# Patient Record
Sex: FEMALE | Race: WHITE | NOT HISPANIC OR LATINO | Employment: OTHER
[De-identification: names, ages, dates, MRNs, and addresses within clinical notes are randomized per-mention and may not be internally consistent; named-entity substitution may affect disease eponyms.]

---

## 2017-01-03 ENCOUNTER — RECORDS - HEALTHEAST (OUTPATIENT)
Dept: ADMINISTRATIVE | Facility: OTHER | Age: 82
End: 2017-01-03

## 2017-02-17 ENCOUNTER — COMMUNICATION - HEALTHEAST (OUTPATIENT)
Dept: FAMILY MEDICINE | Facility: CLINIC | Age: 82
End: 2017-02-17

## 2017-04-18 ENCOUNTER — OFFICE VISIT - HEALTHEAST (OUTPATIENT)
Dept: INTERNAL MEDICINE | Facility: CLINIC | Age: 82
End: 2017-04-18

## 2017-04-18 DIAGNOSIS — M81.0 SENILE OSTEOPOROSIS: ICD-10-CM

## 2017-04-24 ENCOUNTER — COMMUNICATION - HEALTHEAST (OUTPATIENT)
Dept: FAMILY MEDICINE | Facility: CLINIC | Age: 82
End: 2017-04-24

## 2017-04-27 ENCOUNTER — AMBULATORY - HEALTHEAST (OUTPATIENT)
Dept: NURSING | Facility: CLINIC | Age: 82
End: 2017-04-27

## 2017-04-27 DIAGNOSIS — M81.0 SENILE OSTEOPOROSIS: ICD-10-CM

## 2017-05-04 ENCOUNTER — COMMUNICATION - HEALTHEAST (OUTPATIENT)
Dept: FAMILY MEDICINE | Facility: CLINIC | Age: 82
End: 2017-05-04

## 2017-05-04 ENCOUNTER — OFFICE VISIT - HEALTHEAST (OUTPATIENT)
Dept: FAMILY MEDICINE | Facility: CLINIC | Age: 82
End: 2017-05-04

## 2017-05-04 ENCOUNTER — RECORDS - HEALTHEAST (OUTPATIENT)
Dept: ADMINISTRATIVE | Facility: OTHER | Age: 82
End: 2017-05-04

## 2017-05-04 DIAGNOSIS — E78.5 HYPERLIPIDEMIA: ICD-10-CM

## 2017-05-04 DIAGNOSIS — M81.0 SENILE OSTEOPOROSIS: ICD-10-CM

## 2017-05-04 DIAGNOSIS — E11.9 TYPE 2 DIABETES MELLITUS (H): ICD-10-CM

## 2017-05-04 DIAGNOSIS — I25.10 CORONARY ATHEROSCLEROSIS: ICD-10-CM

## 2017-05-04 DIAGNOSIS — I10 ESSENTIAL HYPERTENSION: ICD-10-CM

## 2017-05-04 LAB
CHOLEST SERPL-MCNC: 168 MG/DL
FASTING STATUS PATIENT QL REPORTED: YES
HBA1C MFR BLD: 6.1 % (ref 3.5–6)
HDLC SERPL-MCNC: 71 MG/DL
LDLC SERPL CALC-MCNC: 77 MG/DL
TRIGL SERPL-MCNC: 99 MG/DL

## 2017-05-05 ENCOUNTER — COMMUNICATION - HEALTHEAST (OUTPATIENT)
Dept: FAMILY MEDICINE | Facility: CLINIC | Age: 82
End: 2017-05-05

## 2017-10-31 ENCOUNTER — AMBULATORY - HEALTHEAST (OUTPATIENT)
Dept: NURSING | Facility: CLINIC | Age: 82
End: 2017-10-31

## 2017-10-31 DIAGNOSIS — M81.0 SENILE OSTEOPOROSIS: ICD-10-CM

## 2017-11-02 ENCOUNTER — RECORDS - HEALTHEAST (OUTPATIENT)
Dept: ADMINISTRATIVE | Facility: OTHER | Age: 82
End: 2017-11-02

## 2017-11-02 ENCOUNTER — OFFICE VISIT - HEALTHEAST (OUTPATIENT)
Dept: FAMILY MEDICINE | Facility: CLINIC | Age: 82
End: 2017-11-02

## 2017-11-02 DIAGNOSIS — E11.9 TYPE 2 DIABETES MELLITUS (H): ICD-10-CM

## 2017-11-02 DIAGNOSIS — Z23 NEED FOR IMMUNIZATION AGAINST INFLUENZA: ICD-10-CM

## 2017-11-02 DIAGNOSIS — M81.0 SENILE OSTEOPOROSIS: ICD-10-CM

## 2017-11-02 DIAGNOSIS — I25.10 CORONARY ATHEROSCLEROSIS: ICD-10-CM

## 2017-11-02 DIAGNOSIS — I10 HTN (HYPERTENSION): ICD-10-CM

## 2017-11-02 DIAGNOSIS — E78.5 HYPERLIPIDEMIA: ICD-10-CM

## 2017-11-02 LAB
CHOLEST SERPL-MCNC: 164 MG/DL
FASTING STATUS PATIENT QL REPORTED: YES
HBA1C MFR BLD: 6 % (ref 3.5–6)
HDLC SERPL-MCNC: 75 MG/DL
LDLC SERPL CALC-MCNC: 73 MG/DL
TRIGL SERPL-MCNC: 78 MG/DL

## 2017-11-02 ASSESSMENT — MIFFLIN-ST. JEOR: SCORE: 1050.68

## 2017-11-03 ENCOUNTER — COMMUNICATION - HEALTHEAST (OUTPATIENT)
Dept: FAMILY MEDICINE | Facility: CLINIC | Age: 82
End: 2017-11-03

## 2017-11-07 ENCOUNTER — RECORDS - HEALTHEAST (OUTPATIENT)
Dept: ADMINISTRATIVE | Facility: OTHER | Age: 82
End: 2017-11-07

## 2017-12-06 ENCOUNTER — COMMUNICATION - HEALTHEAST (OUTPATIENT)
Dept: FAMILY MEDICINE | Facility: CLINIC | Age: 82
End: 2017-12-06

## 2017-12-18 ENCOUNTER — COMMUNICATION - HEALTHEAST (OUTPATIENT)
Dept: FAMILY MEDICINE | Facility: CLINIC | Age: 82
End: 2017-12-18

## 2017-12-18 DIAGNOSIS — E78.5 HYPERLIPIDEMIA: ICD-10-CM

## 2017-12-18 DIAGNOSIS — I10 HTN (HYPERTENSION): ICD-10-CM

## 2018-01-29 ENCOUNTER — COMMUNICATION - HEALTHEAST (OUTPATIENT)
Dept: FAMILY MEDICINE | Facility: CLINIC | Age: 83
End: 2018-01-29

## 2018-04-26 ENCOUNTER — COMMUNICATION - HEALTHEAST (OUTPATIENT)
Dept: FAMILY MEDICINE | Facility: CLINIC | Age: 83
End: 2018-04-26

## 2018-05-10 ENCOUNTER — OFFICE VISIT - HEALTHEAST (OUTPATIENT)
Dept: INTERNAL MEDICINE | Facility: CLINIC | Age: 83
End: 2018-05-10

## 2018-05-10 DIAGNOSIS — M81.0 SENILE OSTEOPOROSIS: ICD-10-CM

## 2018-09-25 ENCOUNTER — RECORDS - HEALTHEAST (OUTPATIENT)
Dept: ADMINISTRATIVE | Facility: OTHER | Age: 83
End: 2018-09-25

## 2018-09-25 ENCOUNTER — RECORDS - HEALTHEAST (OUTPATIENT)
Dept: BONE DENSITY | Facility: CLINIC | Age: 83
End: 2018-09-25

## 2018-09-25 DIAGNOSIS — M81.0 AGE-RELATED OSTEOPOROSIS WITHOUT CURRENT PATHOLOGICAL FRACTURE: ICD-10-CM

## 2018-11-07 ENCOUNTER — COMMUNICATION - HEALTHEAST (OUTPATIENT)
Dept: INTERNAL MEDICINE | Facility: CLINIC | Age: 83
End: 2018-11-07

## 2018-11-08 ENCOUNTER — OFFICE VISIT - HEALTHEAST (OUTPATIENT)
Dept: FAMILY MEDICINE | Facility: CLINIC | Age: 83
End: 2018-11-08

## 2018-11-08 DIAGNOSIS — I10 ESSENTIAL HYPERTENSION: ICD-10-CM

## 2018-11-08 DIAGNOSIS — Z79.4 TYPE 2 DIABETES MELLITUS TREATED WITH INSULIN (H): ICD-10-CM

## 2018-11-08 DIAGNOSIS — M81.0 SENILE OSTEOPOROSIS: ICD-10-CM

## 2018-11-08 DIAGNOSIS — Z00.00 ROUTINE GENERAL MEDICAL EXAMINATION AT A HEALTH CARE FACILITY: ICD-10-CM

## 2018-11-08 DIAGNOSIS — E78.2 MIXED HYPERLIPIDEMIA: ICD-10-CM

## 2018-11-08 DIAGNOSIS — E11.9 TYPE 2 DIABETES MELLITUS TREATED WITH INSULIN (H): ICD-10-CM

## 2018-11-08 DIAGNOSIS — Z00.00 HEALTH CARE MAINTENANCE: ICD-10-CM

## 2018-11-08 LAB
ALBUMIN SERPL-MCNC: 4.1 G/DL (ref 3.5–5)
ALP SERPL-CCNC: 75 U/L (ref 45–120)
ALT SERPL W P-5'-P-CCNC: 17 U/L (ref 0–45)
ANION GAP SERPL CALCULATED.3IONS-SCNC: 16 MMOL/L (ref 5–18)
AST SERPL W P-5'-P-CCNC: 27 U/L (ref 0–40)
BILIRUB SERPL-MCNC: 0.6 MG/DL (ref 0–1)
BUN SERPL-MCNC: 26 MG/DL (ref 8–28)
CALCIUM SERPL-MCNC: 10.3 MG/DL (ref 8.5–10.5)
CHLORIDE BLD-SCNC: 107 MMOL/L (ref 98–107)
CHOLEST SERPL-MCNC: 179 MG/DL
CO2 SERPL-SCNC: 20 MMOL/L (ref 22–31)
CREAT SERPL-MCNC: 0.94 MG/DL (ref 0.6–1.1)
CREAT UR-MCNC: 78 MG/DL
FASTING STATUS PATIENT QL REPORTED: YES
GFR SERPL CREATININE-BSD FRML MDRD: 57 ML/MIN/1.73M2
GLUCOSE BLD-MCNC: 116 MG/DL (ref 70–125)
HBA1C MFR BLD: 6 % (ref 3.5–6)
HDLC SERPL-MCNC: 76 MG/DL
HGB BLD-MCNC: 13.6 G/DL (ref 12–16)
LDLC SERPL CALC-MCNC: 87 MG/DL
MICROALBUMIN UR-MCNC: 0.99 MG/DL (ref 0–1.99)
MICROALBUMIN/CREAT UR: 12.7 MG/G
POTASSIUM BLD-SCNC: 4.9 MMOL/L (ref 3.5–5)
PROT SERPL-MCNC: 7.3 G/DL (ref 6–8)
SODIUM SERPL-SCNC: 143 MMOL/L (ref 136–145)
TRIGL SERPL-MCNC: 79 MG/DL

## 2018-11-08 ASSESSMENT — MIFFLIN-ST. JEOR: SCORE: 1040.48

## 2018-11-09 LAB — 25(OH)D3 SERPL-MCNC: 44.5 NG/ML (ref 30–80)

## 2018-11-12 ENCOUNTER — AMBULATORY - HEALTHEAST (OUTPATIENT)
Dept: NURSING | Facility: CLINIC | Age: 83
End: 2018-11-12

## 2018-11-12 ENCOUNTER — COMMUNICATION - HEALTHEAST (OUTPATIENT)
Dept: FAMILY MEDICINE | Facility: CLINIC | Age: 83
End: 2018-11-12

## 2018-11-14 ENCOUNTER — RECORDS - HEALTHEAST (OUTPATIENT)
Dept: ADMINISTRATIVE | Facility: OTHER | Age: 83
End: 2018-11-14

## 2018-12-11 ENCOUNTER — COMMUNICATION - HEALTHEAST (OUTPATIENT)
Dept: FAMILY MEDICINE | Facility: CLINIC | Age: 83
End: 2018-12-11

## 2018-12-11 DIAGNOSIS — I10 ESSENTIAL HYPERTENSION: ICD-10-CM

## 2018-12-28 ENCOUNTER — COMMUNICATION - HEALTHEAST (OUTPATIENT)
Dept: FAMILY MEDICINE | Facility: CLINIC | Age: 83
End: 2018-12-28

## 2018-12-28 DIAGNOSIS — E11.9 TYPE 2 DIABETES MELLITUS (H): ICD-10-CM

## 2019-05-09 ENCOUNTER — RECORDS - HEALTHEAST (OUTPATIENT)
Dept: ADMINISTRATIVE | Facility: OTHER | Age: 84
End: 2019-05-09

## 2019-05-13 ENCOUNTER — COMMUNICATION - HEALTHEAST (OUTPATIENT)
Dept: FAMILY MEDICINE | Facility: CLINIC | Age: 84
End: 2019-05-13

## 2019-05-13 DIAGNOSIS — E11.9 TYPE 2 DIABETES MELLITUS (H): ICD-10-CM

## 2019-05-15 ENCOUNTER — COMMUNICATION - HEALTHEAST (OUTPATIENT)
Dept: FAMILY MEDICINE | Facility: CLINIC | Age: 84
End: 2019-05-15

## 2019-05-15 ENCOUNTER — OFFICE VISIT - HEALTHEAST (OUTPATIENT)
Dept: INTERNAL MEDICINE | Facility: CLINIC | Age: 84
End: 2019-05-15

## 2019-05-15 DIAGNOSIS — M81.0 SENILE OSTEOPOROSIS: ICD-10-CM

## 2019-05-15 DIAGNOSIS — Z79.4 TYPE 2 DIABETES MELLITUS WITHOUT COMPLICATION, WITH LONG-TERM CURRENT USE OF INSULIN (H): ICD-10-CM

## 2019-05-15 DIAGNOSIS — E11.9 TYPE 2 DIABETES MELLITUS WITHOUT COMPLICATION, WITH LONG-TERM CURRENT USE OF INSULIN (H): ICD-10-CM

## 2019-08-01 ENCOUNTER — OFFICE VISIT - HEALTHEAST (OUTPATIENT)
Dept: FAMILY MEDICINE | Facility: CLINIC | Age: 84
End: 2019-08-01

## 2019-08-01 DIAGNOSIS — Z79.4 TYPE 2 DIABETES MELLITUS TREATED WITH INSULIN (H): ICD-10-CM

## 2019-08-01 DIAGNOSIS — E11.9 TYPE 2 DIABETES MELLITUS TREATED WITH INSULIN (H): ICD-10-CM

## 2019-08-01 DIAGNOSIS — L30.9 DERMATITIS: ICD-10-CM

## 2019-08-01 DIAGNOSIS — I83.93 VARICOSE VEINS OF BOTH LOWER EXTREMITIES, UNSPECIFIED WHETHER COMPLICATED: ICD-10-CM

## 2019-08-01 ASSESSMENT — MIFFLIN-ST. JEOR: SCORE: 1028.85

## 2019-09-06 ENCOUNTER — RECORDS - HEALTHEAST (OUTPATIENT)
Dept: ADMINISTRATIVE | Facility: OTHER | Age: 84
End: 2019-09-06

## 2019-11-12 ENCOUNTER — AMBULATORY - HEALTHEAST (OUTPATIENT)
Dept: INTERNAL MEDICINE | Facility: CLINIC | Age: 84
End: 2019-11-12

## 2019-11-12 ENCOUNTER — AMBULATORY - HEALTHEAST (OUTPATIENT)
Dept: NURSING | Facility: CLINIC | Age: 84
End: 2019-11-12

## 2019-11-12 DIAGNOSIS — M81.0 SENILE OSTEOPOROSIS: ICD-10-CM

## 2019-11-12 DIAGNOSIS — Z92.29 PERSONAL HISTORY OF OTHER DRUG THERAPY: ICD-10-CM

## 2019-11-14 ENCOUNTER — OFFICE VISIT - HEALTHEAST (OUTPATIENT)
Dept: FAMILY MEDICINE | Facility: CLINIC | Age: 84
End: 2019-11-14

## 2019-11-14 DIAGNOSIS — E11.9 TYPE 2 DIABETES MELLITUS TREATED WITH INSULIN (H): ICD-10-CM

## 2019-11-14 DIAGNOSIS — I10 ESSENTIAL HYPERTENSION: ICD-10-CM

## 2019-11-14 DIAGNOSIS — Z23 NEED FOR VACCINATION: ICD-10-CM

## 2019-11-14 DIAGNOSIS — Z00.00 ROUTINE GENERAL MEDICAL EXAMINATION AT A HEALTH CARE FACILITY: ICD-10-CM

## 2019-11-14 DIAGNOSIS — Z79.2 NEED FOR PROPHYLACTIC ANTIBIOTIC: ICD-10-CM

## 2019-11-14 DIAGNOSIS — M20.40 ACQUIRED HAMMER TOE: ICD-10-CM

## 2019-11-14 DIAGNOSIS — M81.0 SENILE OSTEOPOROSIS: ICD-10-CM

## 2019-11-14 DIAGNOSIS — L30.9 DERMATITIS: ICD-10-CM

## 2019-11-14 DIAGNOSIS — E78.2 MIXED HYPERLIPIDEMIA: ICD-10-CM

## 2019-11-14 DIAGNOSIS — Z79.4 TYPE 2 DIABETES MELLITUS TREATED WITH INSULIN (H): ICD-10-CM

## 2019-11-14 LAB
ALBUMIN SERPL-MCNC: 4.4 G/DL (ref 3.5–5)
ALP SERPL-CCNC: 72 U/L (ref 45–120)
ALT SERPL W P-5'-P-CCNC: 16 U/L (ref 0–45)
ANION GAP SERPL CALCULATED.3IONS-SCNC: 11 MMOL/L (ref 5–18)
AST SERPL W P-5'-P-CCNC: 24 U/L (ref 0–40)
BILIRUB SERPL-MCNC: 0.8 MG/DL (ref 0–1)
BUN SERPL-MCNC: 31 MG/DL (ref 8–28)
CALCIUM SERPL-MCNC: 10.2 MG/DL (ref 8.5–10.5)
CHLORIDE BLD-SCNC: 106 MMOL/L (ref 98–107)
CHOLEST SERPL-MCNC: 173 MG/DL
CO2 SERPL-SCNC: 24 MMOL/L (ref 22–31)
CREAT SERPL-MCNC: 1.09 MG/DL (ref 0.6–1.1)
CREAT UR-MCNC: 73.8 MG/DL
FASTING STATUS PATIENT QL REPORTED: YES
GFR SERPL CREATININE-BSD FRML MDRD: 48 ML/MIN/1.73M2
GLUCOSE BLD-MCNC: 135 MG/DL (ref 70–125)
HBA1C MFR BLD: 6.3 % (ref 3.5–6)
HDLC SERPL-MCNC: 71 MG/DL
HGB BLD-MCNC: 14.1 G/DL (ref 12–16)
LDLC SERPL CALC-MCNC: 81 MG/DL
MICROALBUMIN UR-MCNC: <0.5 MG/DL (ref 0–1.99)
MICROALBUMIN/CREAT UR: NORMAL MG/G{CREAT}
POTASSIUM BLD-SCNC: 4.3 MMOL/L (ref 3.5–5)
PROT SERPL-MCNC: 7.3 G/DL (ref 6–8)
SODIUM SERPL-SCNC: 141 MMOL/L (ref 136–145)
TRIGL SERPL-MCNC: 106 MG/DL
TSH SERPL DL<=0.005 MIU/L-ACNC: 1.23 UIU/ML (ref 0.3–5)

## 2019-11-14 RX ORDER — TRIAMCINOLONE ACETONIDE 1 MG/G
CREAM TOPICAL
Qty: 45 G | Refills: 1 | Status: SHIPPED | OUTPATIENT
Start: 2019-11-14 | End: 2023-02-20

## 2019-11-14 ASSESSMENT — ANXIETY QUESTIONNAIRES
5. BEING SO RESTLESS THAT IT IS HARD TO SIT STILL: NOT AT ALL
3. WORRYING TOO MUCH ABOUT DIFFERENT THINGS: NOT AT ALL
1. FEELING NERVOUS, ANXIOUS, OR ON EDGE: NOT AT ALL
2. NOT BEING ABLE TO STOP OR CONTROL WORRYING: NOT AT ALL
GAD7 TOTAL SCORE: 0
4. TROUBLE RELAXING: NOT AT ALL
6. BECOMING EASILY ANNOYED OR IRRITABLE: NOT AT ALL
7. FEELING AFRAID AS IF SOMETHING AWFUL MIGHT HAPPEN: NOT AT ALL

## 2019-11-14 ASSESSMENT — MIFFLIN-ST. JEOR: SCORE: 1039.4

## 2019-11-14 ASSESSMENT — PATIENT HEALTH QUESTIONNAIRE - PHQ9: SUM OF ALL RESPONSES TO PHQ QUESTIONS 1-9: 0

## 2019-11-15 ENCOUNTER — COMMUNICATION - HEALTHEAST (OUTPATIENT)
Dept: FAMILY MEDICINE | Facility: CLINIC | Age: 84
End: 2019-11-15

## 2019-11-15 LAB — 25(OH)D3 SERPL-MCNC: 41.6 NG/ML (ref 30–80)

## 2019-11-18 ENCOUNTER — RECORDS - HEALTHEAST (OUTPATIENT)
Dept: ADMINISTRATIVE | Facility: OTHER | Age: 84
End: 2019-11-18

## 2019-11-18 LAB — RETINOPATHY: NEGATIVE

## 2019-11-19 ENCOUNTER — COMMUNICATION - HEALTHEAST (OUTPATIENT)
Dept: OTHER | Facility: CLINIC | Age: 84
End: 2019-11-19

## 2019-11-25 ENCOUNTER — AMBULATORY - HEALTHEAST (OUTPATIENT)
Dept: OTHER | Facility: CLINIC | Age: 84
End: 2019-11-25

## 2019-11-25 ENCOUNTER — COMMUNICATION - HEALTHEAST (OUTPATIENT)
Dept: FAMILY MEDICINE | Facility: CLINIC | Age: 84
End: 2019-11-25

## 2019-11-25 ENCOUNTER — RECORDS - HEALTHEAST (OUTPATIENT)
Dept: HEALTH INFORMATION MANAGEMENT | Facility: CLINIC | Age: 84
End: 2019-11-25

## 2019-11-25 DIAGNOSIS — Z79.4 TYPE 2 DIABETES MELLITUS WITHOUT COMPLICATION, WITH LONG-TERM CURRENT USE OF INSULIN (H): ICD-10-CM

## 2019-11-25 DIAGNOSIS — E11.9 TYPE 2 DIABETES MELLITUS WITHOUT COMPLICATION, WITH LONG-TERM CURRENT USE OF INSULIN (H): ICD-10-CM

## 2019-12-16 ENCOUNTER — AMBULATORY - HEALTHEAST (OUTPATIENT)
Dept: OTHER | Facility: CLINIC | Age: 84
End: 2019-12-16

## 2019-12-27 ENCOUNTER — COMMUNICATION - HEALTHEAST (OUTPATIENT)
Dept: FAMILY MEDICINE | Facility: CLINIC | Age: 84
End: 2019-12-27

## 2019-12-27 DIAGNOSIS — E11.9 TYPE 2 DIABETES MELLITUS TREATED WITH INSULIN (H): ICD-10-CM

## 2019-12-27 DIAGNOSIS — Z79.4 TYPE 2 DIABETES MELLITUS TREATED WITH INSULIN (H): ICD-10-CM

## 2020-04-01 ENCOUNTER — COMMUNICATION - HEALTHEAST (OUTPATIENT)
Dept: FAMILY MEDICINE | Facility: CLINIC | Age: 85
End: 2020-04-01

## 2020-04-01 DIAGNOSIS — Z79.4 TYPE 2 DIABETES MELLITUS TREATED WITH INSULIN (H): ICD-10-CM

## 2020-04-01 DIAGNOSIS — E11.9 TYPE 2 DIABETES MELLITUS TREATED WITH INSULIN (H): ICD-10-CM

## 2020-04-02 ENCOUNTER — COMMUNICATION - HEALTHEAST (OUTPATIENT)
Dept: FAMILY MEDICINE | Facility: CLINIC | Age: 85
End: 2020-04-02

## 2020-04-02 DIAGNOSIS — E11.9 TYPE 2 DIABETES MELLITUS TREATED WITH INSULIN (H): ICD-10-CM

## 2020-04-02 DIAGNOSIS — Z79.4 TYPE 2 DIABETES MELLITUS TREATED WITH INSULIN (H): ICD-10-CM

## 2020-06-17 ENCOUNTER — AMBULATORY - HEALTHEAST (OUTPATIENT)
Dept: INTERNAL MEDICINE | Facility: CLINIC | Age: 85
End: 2020-06-17

## 2020-06-17 DIAGNOSIS — M81.0 SENILE OSTEOPOROSIS: ICD-10-CM

## 2020-06-17 DIAGNOSIS — Z92.29 PERSONAL HISTORY OF OTHER DRUG THERAPY: ICD-10-CM

## 2020-06-18 ENCOUNTER — OFFICE VISIT - HEALTHEAST (OUTPATIENT)
Dept: INTERNAL MEDICINE | Facility: CLINIC | Age: 85
End: 2020-06-18

## 2020-06-18 DIAGNOSIS — M81.0 SENILE OSTEOPOROSIS: ICD-10-CM

## 2020-08-18 ENCOUNTER — COMMUNICATION - HEALTHEAST (OUTPATIENT)
Dept: FAMILY MEDICINE | Facility: CLINIC | Age: 85
End: 2020-08-18

## 2020-08-18 DIAGNOSIS — Z79.4 TYPE 2 DIABETES MELLITUS WITHOUT COMPLICATION, WITH LONG-TERM CURRENT USE OF INSULIN (H): ICD-10-CM

## 2020-08-18 DIAGNOSIS — E11.9 TYPE 2 DIABETES MELLITUS WITHOUT COMPLICATION, WITH LONG-TERM CURRENT USE OF INSULIN (H): ICD-10-CM

## 2020-10-09 ENCOUNTER — COMMUNICATION - HEALTHEAST (OUTPATIENT)
Dept: FAMILY MEDICINE | Facility: CLINIC | Age: 85
End: 2020-10-09

## 2020-10-09 DIAGNOSIS — Z79.4 TYPE 2 DIABETES MELLITUS WITHOUT COMPLICATION, WITH LONG-TERM CURRENT USE OF INSULIN (H): ICD-10-CM

## 2020-10-09 DIAGNOSIS — E11.9 TYPE 2 DIABETES MELLITUS WITHOUT COMPLICATION, WITH LONG-TERM CURRENT USE OF INSULIN (H): ICD-10-CM

## 2020-10-19 ENCOUNTER — RECORDS - HEALTHEAST (OUTPATIENT)
Dept: ADMINISTRATIVE | Facility: OTHER | Age: 85
End: 2020-10-19

## 2020-10-20 ENCOUNTER — COMMUNICATION - HEALTHEAST (OUTPATIENT)
Dept: FAMILY MEDICINE | Facility: CLINIC | Age: 85
End: 2020-10-20

## 2020-12-09 ENCOUNTER — OFFICE VISIT - HEALTHEAST (OUTPATIENT)
Dept: FAMILY MEDICINE | Facility: CLINIC | Age: 85
End: 2020-12-09

## 2020-12-09 ENCOUNTER — COMMUNICATION - HEALTHEAST (OUTPATIENT)
Dept: FAMILY MEDICINE | Facility: CLINIC | Age: 85
End: 2020-12-09

## 2020-12-09 DIAGNOSIS — I10 ESSENTIAL HYPERTENSION: ICD-10-CM

## 2020-12-09 DIAGNOSIS — E78.2 MIXED HYPERLIPIDEMIA: ICD-10-CM

## 2020-12-09 DIAGNOSIS — Z00.00 MEDICARE ANNUAL WELLNESS VISIT, SUBSEQUENT: ICD-10-CM

## 2020-12-09 DIAGNOSIS — E11.9 TYPE 2 DIABETES MELLITUS TREATED WITH INSULIN (H): ICD-10-CM

## 2020-12-09 DIAGNOSIS — Z79.4 TYPE 2 DIABETES MELLITUS TREATED WITH INSULIN (H): ICD-10-CM

## 2020-12-09 DIAGNOSIS — Z79.2 NEED FOR PROPHYLACTIC ANTIBIOTIC: ICD-10-CM

## 2020-12-09 DIAGNOSIS — M81.0 SENILE OSTEOPOROSIS: ICD-10-CM

## 2020-12-09 DIAGNOSIS — N18.31 STAGE 3A CHRONIC KIDNEY DISEASE (H): ICD-10-CM

## 2020-12-09 LAB
ALBUMIN SERPL-MCNC: 4.1 G/DL (ref 3.5–5)
ALP SERPL-CCNC: 73 U/L (ref 45–120)
ALT SERPL W P-5'-P-CCNC: 21 U/L (ref 0–45)
ANION GAP SERPL CALCULATED.3IONS-SCNC: 13 MMOL/L (ref 5–18)
AST SERPL W P-5'-P-CCNC: 31 U/L (ref 0–40)
BILIRUB SERPL-MCNC: 0.7 MG/DL (ref 0–1)
BUN SERPL-MCNC: 29 MG/DL (ref 8–28)
CALCIUM SERPL-MCNC: 9.9 MG/DL (ref 8.5–10.5)
CHLORIDE BLD-SCNC: 107 MMOL/L (ref 98–107)
CHOLEST SERPL-MCNC: 169 MG/DL
CO2 SERPL-SCNC: 23 MMOL/L (ref 22–31)
CREAT SERPL-MCNC: 1.09 MG/DL (ref 0.6–1.1)
CREAT UR-MCNC: 82.5 MG/DL
FASTING STATUS PATIENT QL REPORTED: YES
GFR SERPL CREATININE-BSD FRML MDRD: 48 ML/MIN/1.73M2
GLUCOSE BLD-MCNC: 132 MG/DL (ref 70–125)
HBA1C MFR BLD: 6.1 %
HDLC SERPL-MCNC: 80 MG/DL
HGB BLD-MCNC: 13.3 G/DL (ref 12–16)
LDLC SERPL CALC-MCNC: 70 MG/DL
MICROALBUMIN UR-MCNC: 1.56 MG/DL (ref 0–1.99)
MICROALBUMIN/CREAT UR: 18.9 MG/G
POTASSIUM BLD-SCNC: 4.7 MMOL/L (ref 3.5–5)
PROT SERPL-MCNC: 7 G/DL (ref 6–8)
SODIUM SERPL-SCNC: 143 MMOL/L (ref 136–145)
TRIGL SERPL-MCNC: 97 MG/DL

## 2020-12-09 RX ORDER — BLOOD SUGAR DIAGNOSTIC
STRIP MISCELLANEOUS
Qty: 400 STRIP | Refills: 3 | Status: SHIPPED | OUTPATIENT
Start: 2020-12-09 | End: 2022-02-03

## 2020-12-09 RX ORDER — ATORVASTATIN CALCIUM 40 MG/1
TABLET, FILM COATED ORAL
Qty: 90 TABLET | Refills: 3 | Status: SHIPPED | OUTPATIENT
Start: 2020-12-09 | End: 2021-11-01

## 2020-12-09 RX ORDER — AMOXICILLIN 500 MG/1
CAPSULE ORAL
Qty: 4 CAPSULE | Refills: 3 | Status: SHIPPED | OUTPATIENT
Start: 2020-12-09 | End: 2022-03-15

## 2020-12-09 RX ORDER — LISINOPRIL AND HYDROCHLOROTHIAZIDE 12.5; 2 MG/1; MG/1
1 TABLET ORAL DAILY
Qty: 90 TABLET | Refills: 3 | Status: SHIPPED | OUTPATIENT
Start: 2020-12-09 | End: 2021-11-02

## 2020-12-09 ASSESSMENT — PATIENT HEALTH QUESTIONNAIRE - PHQ9: SUM OF ALL RESPONSES TO PHQ QUESTIONS 1-9: 0

## 2020-12-09 ASSESSMENT — MIFFLIN-ST. JEOR: SCORE: 1024.88

## 2020-12-10 LAB — 25(OH)D3 SERPL-MCNC: 41.6 NG/ML (ref 30–80)

## 2020-12-15 ENCOUNTER — COMMUNICATION - HEALTHEAST (OUTPATIENT)
Dept: FAMILY MEDICINE | Facility: CLINIC | Age: 85
End: 2020-12-15

## 2021-01-13 ENCOUNTER — AMBULATORY - HEALTHEAST (OUTPATIENT)
Dept: NURSING | Facility: CLINIC | Age: 86
End: 2021-01-13

## 2021-01-13 ENCOUNTER — RECORDS - HEALTHEAST (OUTPATIENT)
Dept: BONE DENSITY | Facility: CLINIC | Age: 86
End: 2021-01-13

## 2021-01-13 ENCOUNTER — RECORDS - HEALTHEAST (OUTPATIENT)
Dept: ADMINISTRATIVE | Facility: OTHER | Age: 86
End: 2021-01-13

## 2021-01-13 DIAGNOSIS — M81.0 AGE-RELATED OSTEOPOROSIS WITHOUT CURRENT PATHOLOGICAL FRACTURE: ICD-10-CM

## 2021-01-14 ENCOUNTER — COMMUNICATION - HEALTHEAST (OUTPATIENT)
Dept: FAMILY MEDICINE | Facility: CLINIC | Age: 86
End: 2021-01-14

## 2021-02-09 ENCOUNTER — AMBULATORY - HEALTHEAST (OUTPATIENT)
Dept: NURSING | Facility: CLINIC | Age: 86
End: 2021-02-09

## 2021-02-15 ENCOUNTER — RECORDS - HEALTHEAST (OUTPATIENT)
Dept: ADMINISTRATIVE | Facility: OTHER | Age: 86
End: 2021-02-15

## 2021-02-15 ENCOUNTER — AMBULATORY - HEALTHEAST (OUTPATIENT)
Dept: MULTI SPECIALTY CLINIC | Facility: CLINIC | Age: 86
End: 2021-02-15

## 2021-02-15 LAB — RETINOPATHY: NORMAL

## 2021-03-02 ENCOUNTER — AMBULATORY - HEALTHEAST (OUTPATIENT)
Dept: NURSING | Facility: CLINIC | Age: 86
End: 2021-03-02

## 2021-03-19 ENCOUNTER — COMMUNICATION - HEALTHEAST (OUTPATIENT)
Dept: FAMILY MEDICINE | Facility: CLINIC | Age: 86
End: 2021-03-19

## 2021-03-19 DIAGNOSIS — Z79.4 TYPE 2 DIABETES MELLITUS WITHOUT COMPLICATION, WITH LONG-TERM CURRENT USE OF INSULIN (H): ICD-10-CM

## 2021-03-19 DIAGNOSIS — E11.9 TYPE 2 DIABETES MELLITUS WITHOUT COMPLICATION, WITH LONG-TERM CURRENT USE OF INSULIN (H): ICD-10-CM

## 2021-03-19 DIAGNOSIS — E11.9 TYPE 2 DIABETES MELLITUS TREATED WITH INSULIN (H): ICD-10-CM

## 2021-03-19 DIAGNOSIS — Z79.4 TYPE 2 DIABETES MELLITUS TREATED WITH INSULIN (H): ICD-10-CM

## 2021-05-25 ENCOUNTER — RECORDS - HEALTHEAST (OUTPATIENT)
Dept: ADMINISTRATIVE | Facility: CLINIC | Age: 86
End: 2021-05-25

## 2021-05-26 ENCOUNTER — RECORDS - HEALTHEAST (OUTPATIENT)
Dept: ADMINISTRATIVE | Facility: CLINIC | Age: 86
End: 2021-05-26

## 2021-05-26 ASSESSMENT — PATIENT HEALTH QUESTIONNAIRE - PHQ9: SUM OF ALL RESPONSES TO PHQ QUESTIONS 1-9: 0

## 2021-05-27 ENCOUNTER — RECORDS - HEALTHEAST (OUTPATIENT)
Dept: ADMINISTRATIVE | Facility: CLINIC | Age: 86
End: 2021-05-27

## 2021-05-27 ASSESSMENT — PATIENT HEALTH QUESTIONNAIRE - PHQ9: SUM OF ALL RESPONSES TO PHQ QUESTIONS 1-9: 0

## 2021-05-28 ENCOUNTER — RECORDS - HEALTHEAST (OUTPATIENT)
Dept: ADMINISTRATIVE | Facility: CLINIC | Age: 86
End: 2021-05-28

## 2021-05-28 ASSESSMENT — ANXIETY QUESTIONNAIRES: GAD7 TOTAL SCORE: 0

## 2021-05-28 NOTE — PATIENT INSTRUCTIONS - HE
Prolia 11th today.  Prolia 12th in 6 months with my nurse. I will see you in 1 year.    DXA in 9/2020 .   Phone number to schedule [phone].    Daily calcium need is 7007-3849 mg a day from the diet and supplements.  Calcium citrate is easier to digest.  Vitamin D 2000 IU daily recommended.

## 2021-05-28 NOTE — TELEPHONE ENCOUNTER
Pt is needing her test strips ASAP, she is leaving town on Thursday and will be gone for several days.

## 2021-05-28 NOTE — TELEPHONE ENCOUNTER
LM no return call needed. Advised new RX was sent to pharm for glucose meter, test strips and lancets per her insurance coverage.

## 2021-05-28 NOTE — PROGRESS NOTES
1. Osteoporosis Senile       Patient was educated on safety of Prolia utilizing Patient Counseling Chart for Healthcare Providers, as outlined by the Prolia REMS progam.     Return in about 6 months (around 11/15/2019) for Recheck, Prolia injection.    Patient Instructions   Prolia 11th today.  Prolia 12th in 6 months with my nurse. I will see you in 1 year.    DXA in 9/2020 .   Phone number to schedule [phone].    Daily calcium need is 0572-5755 mg a day from the diet and supplements.  Calcium citrate is easier to digest.  Vitamin D 2000 IU daily recommended.      Chief Complaint   Patient presents with     Osteoporosis Follow Up     Osteo F/U       /76   Pulse 91   Wt 128 lb 8 oz (58.3 kg)   SpO2 100%   BMI 20.28 kg/m        Did you experience any problems with previous Prolia injection? no  Any medication change in the last 6 months? no  Did you take prednisone or other immunosupressant drugs in the last 6 months   (chemo, transplant, rheum, dermatology conditions)? no  Did you have any serious infection in the last 6 months?no  Any recent hospitalizations?no  Do you plan any dental work in the next 2-3 months?no  How much calcium do you take daily from the diet and supplements?1200 mg  How much vit D do you take daily? 2000 IU  Last DXA? 9/2018, Reviewed and discussed      Patient is here today for the 11th Prolia injection. Patient tolerated previous injections well.   We discussed calcium and vit D daily needs today.   Next Prolia injection will be in 6 months.     15 minutes spent with the patient and more then 50 % of the time in counseling.  This note has been dictated using voice recognition software. Any grammatical or context distortions are unintentional and inherent to the software      Patient Active Problem List   Diagnosis     Hyperlipidemia     Hypertension     Contact Dermatitis     Type 2 diabetes mellitus treated with insulin (H)     History of acute myocardial infarction      "Osteoporosis Senile       Current Outpatient Medications on File Prior to Visit   Medication Sig Dispense Refill     amoxicillin (AMOXIL) 500 MG capsule TAKE 4 CAPSULES BY MOUTH 1 HOUR PRIOR TO DENTAL PROCEDURE. 4 capsule 3     aspirin 81 MG EC tablet Take 81 mg by mouth daily.       atorvastatin (LIPITOR) 40 MG tablet TAKE 1 TABLET BY MOUTH EVERY DAY 90 tablet 3     blood glucose test (ONETOUCH ULTRA BLUE TEST STRIP) strips USE TO TEST FOUR TIMES DAILY AS DIRECTED Test before each meal and before bed. 400 strip 3     calcium carbonate 1250 MG capsule Take 1,250 mg by mouth 2 (two) times a day with meals.       cholecalciferol, vitamin D3, 1,000 unit tablet Take 1,000 Units by mouth daily.       denosumab (PROLIA) 60 mg/mL Syrg Inject 60 mg under the skin every 6 (six) months.       generic lancets (ONETOUCH ULTRASOFT) Use to test blood sugars 4 times daily 100 each 5     insulin glargine (LANTUS; BASAGLAR) 100 unit/mL (3 mL) pen Inject up to 20 units SC at bedtime 5 adj dose pen 3     insulin lispro (HUMALOG KWIKPEN INSULIN) 100 unit/mL pen INJECT UP TO 50 UNITS DAILY 45 mL 3     lisinopril-hydrochlorothiazide (PRINZIDE,ZESTORETIC) 20-12.5 mg per tablet TAKE 1 TABLET BY MOUTH EVERY DAY 90 tablet 3     pen needle, diabetic 31 gauge x 5/16\" Ndle Use to inject up to 50 units of insulin daily 100 each 4     Current Facility-Administered Medications on File Prior to Visit   Medication Dose Route Frequency Provider Last Rate Last Dose     denosumab 60 mg (PROLIA 60 mg/ml)  60 mg Subcutaneous Q6 Months Kashif Darden MD   60 mg at 05/15/19 1331     "

## 2021-05-28 NOTE — TELEPHONE ENCOUNTER
Pt called states her insurance will no longer her one touch  meter and supplies. She needs new RX sent  Per insurance coverage.

## 2021-05-29 ENCOUNTER — RECORDS - HEALTHEAST (OUTPATIENT)
Dept: ADMINISTRATIVE | Facility: CLINIC | Age: 86
End: 2021-05-29

## 2021-05-30 VITALS — WEIGHT: 125.44 LBS | BODY MASS INDEX: 19.65 KG/M2

## 2021-05-30 VITALS — WEIGHT: 128.7 LBS | BODY MASS INDEX: 20.16 KG/M2

## 2021-05-31 VITALS — HEIGHT: 67 IN | WEIGHT: 127.44 LBS | BODY MASS INDEX: 20 KG/M2

## 2021-05-31 NOTE — PROGRESS NOTES
Assessment/Plan:     1. Dermatitis  triamcinolone (KENALOG) 0.1 % cream   2. Varicose veins of both lower extremities, unspecified whether complicated     3. Type 2 diabetes mellitus treated with insulin (H)         Diagnoses and all orders for this visit:    Dermatitis  -     triamcinolone (KENALOG) 0.1 % cream; Apply sparingly to affected skin 2-3 times daily for up to 14 days.  Dispense: 45 g; Refill: 1  -Counseled on use of medication and side effects.  Notify us if dermatitis does not improve with treatment or if new concerns develop.    Varicose veins of both lower extremities, unspecified whether complicated  Suggested venous insufficiency ultrasound.  She spends her summer up north near [place] and prefers to wait until she returns in November for her physical to order this    Type 2 diabetes mellitus treated with insulin (H)  Patient advised that she is due for diabetic check.  She declines today as she prefers to wait until she comes in for physical to do her check.                 Subjective:      Kenyatta Og is a 84 y.o. female who comes in today with concern about a rash on her right lower leg.  She reports that this has been present for about 2 weeks.  She has been spending time at her family's cabin near [place].  She does not recall coming into contact with anything that she believes could have caused the rash.  The rash is located only on her right lower leg.  She does not have rash elsewhere on her body.  It is not painful or itchy.  She has not had any insect or tick bites.  She is otherwise been feeling well.  She has not had fevers or chills.  She is a diabetic.  Blood sugars remain well controlled.  She recalls that a few years ago she had a similar type of rash when she was in [place].  She saw a physician in [place] who told her that she either had a grass or a sun allergy.  She recalls that she was prescribed an ointment and she use this and the rash resolved.  She has been applying  "some antibiotic ointment to the rash currently but has not tried any other topical creams or lotions.  She does have varicose veins.  She has not had lower extremity edema.  We reviewed medications and allergies in chart is updated.  She has no other concerns or questions today.    Current Outpatient Medications   Medication Sig Dispense Refill     aspirin 81 MG EC tablet Take 81 mg by mouth daily.       atorvastatin (LIPITOR) 40 MG tablet TAKE 1 TABLET BY MOUTH EVERY DAY 90 tablet 3     blood glucose meter (GLUCOMETER) Use 1 each As Directed as needed. Dispense glucometer brand per patient's insurance at pharmacy discretion. test blood sugars 4 times daily 1 each 1     blood glucose test strips Use 1 each As Directed 4 (four) times a day. Dispense brand per patient's insurance at pharmacy discretion. Test blood sugars 4 times daily 200 strip 3     calcium carbonate 1250 MG capsule Take 1,250 mg by mouth 2 (two) times a day with meals.       cholecalciferol, vitamin D3, 1,000 unit tablet Take 1,000 Units by mouth daily.       denosumab (PROLIA) 60 mg/mL Syrg Inject 60 mg under the skin every 6 (six) months.       generic lancets (ACCU-CHEK SOFTCLIX LANCETS) Dispense brand per patient's insurance at pharmacy discretion. Test blood sugars 4 times daily 200 each 3     insulin glargine (LANTUS; BASAGLAR) 100 unit/mL (3 mL) pen Inject up to 20 units SC at bedtime 5 adj dose pen 3     insulin lispro (HUMALOG KWIKPEN INSULIN) 100 unit/mL pen INJECT UP TO 50 UNITS DAILY 45 mL 3     lisinopril-hydrochlorothiazide (PRINZIDE,ZESTORETIC) 20-12.5 mg per tablet TAKE 1 TABLET BY MOUTH EVERY DAY 90 tablet 3     pen needle, diabetic 31 gauge x 5/16\" Ndle Use to inject up to 50 units of insulin daily 100 each 4     amoxicillin (AMOXIL) 500 MG capsule TAKE 4 CAPSULES BY MOUTH 1 HOUR PRIOR TO DENTAL PROCEDURE. 4 capsule 3     triamcinolone (KENALOG) 0.1 % cream Apply sparingly to affected skin 2-3 times daily for up to 14 days. 45 g 1 "     Current Facility-Administered Medications   Medication Dose Route Frequency Provider Last Rate Last Dose     denosumab 60 mg (PROLIA 60 mg/ml)  60 mg Subcutaneous Q6 Months Kashif Darden MD   60 mg at 05/15/19 8441       Past Medical History, Family History, and Social History reviewed.  No past medical history on file.  Past Surgical History:   Procedure Laterality Date     [place] REMOVAL GALLBLADDER      Description: Cholecystectomy;  Recorded: 02/29/2008;     Patient has no known allergies.  No family history on file.  Social History     Socioeconomic History     Marital status:      Spouse name: Not on file     Number of children: Not on file     Years of education: Not on file     Highest education level: Not on file   Occupational History     Not on file   Social Needs     Financial resource strain: Not on file     Food insecurity:     Worry: Not on file     Inability: Not on file     Transportation needs:     Medical: Not on file     Non-medical: Not on file   Tobacco Use     Smoking status: Never Smoker     Smokeless tobacco: Never Used   Substance and Sexual Activity     Alcohol use: No     Drug use: No     Sexual activity: Never   Lifestyle     Physical activity:     Days per week: Not on file     Minutes per session: Not on file     Stress: Not on file   Relationships     Social connections:     Talks on phone: Not on file     Gets together: Not on file     Attends Catholic service: Not on file     Active member of club or organization: Not on file     Attends meetings of clubs or organizations: Not on file     Relationship status: Not on file     Intimate partner violence:     Fear of current or ex partner: Not on file     Emotionally abused: Not on file     Physically abused: Not on file     Forced sexual activity: Not on file   Other Topics Concern     Not on file   Social History Narrative     Not on file         Review of systems is as stated in HPI, and the remainder of the 10 system  "review is otherwise negative.    Objective:     Vitals:    08/01/19 0922   BP: 110/64   Patient Site: Left Arm   Patient Position: Sitting   Cuff Size: Adult Regular   Pulse: 89   Temp: 97.8  F (36.6  C)   TempSrc: Oral   SpO2: 97%   Weight: 124 lb 6 oz (56.4 kg)   Height: 5' 6.5\" (1.689 m)    Body mass index is 19.77 kg/m .    General appearance: alert, appears stated age and cooperative  Head: Normocephalic, without obvious abnormality, atraumatic  Skin: On medial aspect of inferior right shin is erythematous patch of skin that is slightly dry and scaly.  It blanches with pressure.  Similar smaller patch is present just proximal to this.    Ext: Varicose veins noted bilateral lower extremities.  She has normal dorsalis pedis pulses bilaterally. No lower extremity edema          This note has been dictated using voice recognition software. Any grammatical or context distortions are unintentional and inherent to the the software.       "

## 2021-06-01 ENCOUNTER — RECORDS - HEALTHEAST (OUTPATIENT)
Dept: ADMINISTRATIVE | Facility: CLINIC | Age: 86
End: 2021-06-01

## 2021-06-01 VITALS — WEIGHT: 129.9 LBS | BODY MASS INDEX: 20.35 KG/M2

## 2021-06-02 VITALS — WEIGHT: 126.06 LBS | BODY MASS INDEX: 19.79 KG/M2 | HEIGHT: 67 IN

## 2021-06-03 VITALS — BODY MASS INDEX: 20.28 KG/M2 | WEIGHT: 128.5 LBS

## 2021-06-03 VITALS — BODY MASS INDEX: 19.52 KG/M2 | HEIGHT: 67 IN | WEIGHT: 124.38 LBS

## 2021-06-03 NOTE — PROGRESS NOTES
"Prolia Injection Phone Screen      Screening questions have been asked 2-3 days prior to administration visit for Prolia. If any questions are answered with \"Yes,\" this phone encounter were will routed to ordering provider for further evaluation.     1.  When was the last injection?  5/15/19    2.  Has insurance for this injection been verified?  Yes    3.  Did you experience any new onset achiness or rashes that lasted for over a month with your previous Prolia injection?   No    4.  Do you have a fever over 101?F or a new deep cough that is unusual for you today? No    5.  Have you started any new medications in the last 6 months that you were told could affect your immune system? These may have been prescribed by oncologist, transplant, rheumatology, or dermatology.   No    6.  In the last 6 months have you have gastric bypass or parathyroid surgery?   No    7.  Do you plan dental work requiring drilling into the bone such as implants/extractions or oral surgery in the next 2-3 months?   No    8. Do you have new insurance since the last injection? No    Patient informed if symptoms discussed above present prior to their administration appointment, they are to notify clinic immediately.     Monika Jansen              "

## 2021-06-03 NOTE — PROGRESS NOTES
S: Pt. seen in [place] office. Male. Dr. Lazar. RX: Foot Orthotics 3 Pr. , DM shoes. DX: Type 2 DM, HDS, Callus formation B/L 3rd MTH.  O: Pt. has not has DM shoes and FO in the past. No surgeries to date.  AG:: Pain 3/10. Pes Cavus feet. Hind and fore foot neutral. HDS on right 2-5 only. ROM within norm. 1st ray flexible. Heel rise inverted. Bilateral callus formation on 3rd MTH plantar. Pinch callus 1st ray medial bilaterally. Today I C/M with bio foam for Custom DM [place] FO. FO to provide lift and support on medial long. arches. Will also reduce pinch callus.  Met pads to offload callus formation on B/L 3rd MTH's. Custom FO are needed to accommodate Pes Cavus feet and offloading of callus formation.  Pt. choose Dr. Dowell [id number] size 9.5 M shoes to accommodate DMFO.   P: Pt. to be seen for F/D.  Order # [id number]    Wayne Fung [employer],

## 2021-06-03 NOTE — PROGRESS NOTES
Assessment and Plan:       Kenyatta was seen today for annual wellness visit.    Routine general medical examination at a health care facility  -     Hemoglobin  -Discussed the new shingles vaccine that is available.  Encouraged her to get this at the pharmacy.  Influenza vaccine administered.  Vaccines otherwise up-to-date.  Continue with regular exercise and healthy diet reviewed follow-up in 1 year for yearly physical    Need for vaccination  -     Influenza High Dose, Seasonal 65+ yrs    Type 2 diabetes mellitus treated with insulin (H)  -     Microalbumin, Random Urine  -     Lipid Cascade FASTING  -     Glycosylated Hemoglobin A1c  -     Ambulatory referral for Orthotics / Prosthetics - [facility]  -Continue aspirin 81 mg daily, atorvastatin 40 mg daily and lisinopril.  Blood pressure is at goal.  We will check A1c, urine microalbumin and lipids today.  She gets regular vision exams.  Continue with regular exercise.  Continue Lantus and Humalog sliding scales.  Follow-up again in 6 months.  I feel she would benefit from diabetic shoes.  She does have hammertoes with mild erythema as well as callus formation.  Order placed for diabetic shoes.    Mixed hyperlipidemia  -     Lipid Cascade FASTING  -     Comprehensive Metabolic Panel  -     Thyroid Stimulating Hormone (TSH)  Continue regular exercise.  Continue current dose of atorvastatin-    Hypertension  -     Comprehensive Metabolic Panel  -     Thyroid Stimulating Hormone (TSH)  -Controlled.  Continue current medication    Osteoporosis Senile  -     Vitamin D, Total (25-Hydroxy)  -     Thyroid Stimulating Hormone (TSH)  -She continues to follow with osteoporosis care and will continue to receive Prolia injections.  We will check vitamin D and TSH.  Continue calcium and vitamin D supplement and walking for exercise    Acquired hammer toe  -     Ambulatory referral for Orthotics / Prosthetics - [facility]        The patient's current medical problems were  reviewed.      The following health maintenance schedule was reviewed with the patient and provided in printed form in the after visit summary:   Health Maintenance   Topic Date Due     ZOSTER VACCINES (2 of 3) 12/09/2014     A1C  05/08/2019     INFLUENZA VACCINE RULE BASED (1) 08/01/2019     DIABETIC EYE EXAM  10/01/2019     BMP  11/08/2019     DIABETIC FOOT EXAM  11/08/2019     LIPID  11/08/2019     MICROALBUMIN  11/08/2019     FALL RISK ASSESSMENT  11/08/2019     MEDICARE ANNUAL WELLNESS VISIT  11/08/2019     DXA SCAN  09/25/2020     ADVANCE CARE PLANNING  11/08/2023     TD 18+ HE  08/28/2024     PNEUMOCOCCAL IMMUNIZATION 65+ LOW/MEDIUM RISK  Completed        Subjective:   Chief Complaint: Kenyatta Og is an 85 y.o. female here for an Annual Wellness visit.   HPI: We reviewed her health history.  She is also here to follow-up on type 2 diabetes, hyperlipidemia and hypertension.  She was last seen in May for dermatitis of the right lower extremity.  This resolved with triamcinolone cream.  She continues to follow with osteoporosis care and continues with Prolia injections.  She continues on calcium and vitamin D supplement.  She continues to monitor blood sugars several times a day.  Blood sugars remain under good control.  No significant hypoglycemia.  Continues on Basaglar sliding scale at bedtime.  Typically does not take more than 12 to 15 units of Basaglar per day.  Uses Humalog sliding scale with meals.  Counts her carbs and adjust her insulin based on glucose reading and carb counts.  Continues to feel very comfortable with managing her diabetes.  She has history of mild heart attack many years ago.  Remains on atorvastatin and aspirin.  Blood pressure is well controlled with lisinopril-hydrochlorothiazide.  She denies any new concerns with chest pain or pressure.  No dyspnea.  No lower extremity edema.  She does have varicose veins.  These do not bother her.  No dizziness or lightheadedness.  No  paresthesias in her feet.  Energy is good.  Walks nearly every day for exercise.  Gets regular vision and dental exams.  Will be going to [place] for the winter.  Leaves January 1 and returns in early May.  Reviewed medications, allergies, family and social history and updated the chart.  Review of systems is assessed and is otherwise negative.  No other concerns or questions today.    Review of Systems:  Please see above.  The rest of the review of systems are negative for all systems.    Patient Care Team:  Jaz Lazar MD as PCP - Noel Peña MD as Physician (Orthopedic Surgery)  Kashif Darden MD as Physician (Internal Medicine)  Jaz Lazar MD as Assigned PCP     Patient Active Problem List   Diagnosis     Hyperlipidemia     Hypertension     Contact Dermatitis     Type 2 diabetes mellitus treated with insulin (H)     History of acute myocardial infarction     Osteoporosis Senile     No past medical history on file.   Past Surgical History:   Procedure Laterality Date     [place] REMOVAL GALLBLADDER      Description: Cholecystectomy;  Recorded: 02/29/2008;      No family history on file.   Social History     Socioeconomic History     Marital status:      Spouse name: Not on file     Number of children: Not on file     Years of education: Not on file     Highest education level: Not on file   Occupational History     Not on file   Social Needs     Financial resource strain: Not on file     Food insecurity:     Worry: Not on file     Inability: Not on file     Transportation needs:     Medical: Not on file     Non-medical: Not on file   Tobacco Use     Smoking status: Never Smoker     Smokeless tobacco: Never Used   Substance and Sexual Activity     Alcohol use: No     Drug use: No     Sexual activity: Never   Lifestyle     Physical activity:     Days per week: Not on file     Minutes per session: Not on file     Stress: Not on file   Relationships     Social connections:     Talks on  phone: Not on file     Gets together: Not on file     Attends Jainism service: Not on file     Active member of club or organization: Not on file     Attends meetings of clubs or organizations: Not on file     Relationship status: Not on file     Intimate partner violence:     Fear of current or ex partner: Not on file     Emotionally abused: Not on file     Physically abused: Not on file     Forced sexual activity: Not on file   Other Topics Concern     Not on file   Social History Narrative     Not on file      Current Outpatient Medications   Medication Sig Dispense Refill     amoxicillin (AMOXIL) 500 MG capsule TAKE 4 CAPSULES BY MOUTH 1 HOUR PRIOR TO DENTAL PROCEDURE. 4 capsule 3     aspirin 81 MG EC tablet Take 81 mg by mouth daily.       atorvastatin (LIPITOR) 40 MG tablet TAKE 1 TABLET BY MOUTH EVERY DAY 90 tablet 3     blood glucose meter (GLUCOMETER) Use 1 each As Directed as needed. Dispense glucometer brand per patient's insurance at pharmacy discretion. test blood sugars 4 times daily 1 each 1     blood glucose test strips Use 1 each As Directed 4 (four) times a day. Dispense brand per patient's insurance at pharmacy discretion. Test blood sugars 4 times daily 200 strip 3     calcium carbonate 1250 MG capsule Take 1,250 mg by mouth 2 (two) times a day with meals.       cholecalciferol, vitamin D3, 1,000 unit tablet Take 1,000 Units by mouth daily.       denosumab (PROLIA) 60 mg/mL Syrg Inject 60 mg under the skin every 6 (six) months.       generic lancets (ACCU-CHEK SOFTCLIX LANCETS) Dispense brand per patient's insurance at pharmacy discretion. Test blood sugars 4 times daily 200 each 3     insulin glargine (LANTUS SOLOSTAR PEN) 100 unit/mL (3 mL) pen Inject up to 20 units SC at bedtime 5 adj dose pen 3     insulin lispro (HUMALOG KWIKPEN INSULIN) 100 unit/mL pen INJECT UP TO 50 UNITS DAILY 45 mL 3     lisinopril-hydrochlorothiazide (PRINZIDE,ZESTORETIC) 20-12.5 mg per tablet Take 1 tablet by mouth  "daily. 90 tablet 3     pen needle, diabetic 31 gauge x 5/16\" Ndle Use to inject up to 50 units of insulin daily 100 each 4     triamcinolone (KENALOG) 0.1 % cream Apply sparingly to affected skin 2-3 times daily for up to 14 days. 45 g 1     Current Facility-Administered Medications   Medication Dose Route Frequency Provider Last Rate Last Dose     denosumab 60 mg (PROLIA 60 mg/ml)  60 mg Subcutaneous Q6 Months Kashif Darden MD          Objective:   Vital Signs:   Visit Vitals  /90 (Patient Site: Right Arm, Patient Position: Sitting, Cuff Size: Adult Regular)   Pulse 91   Temp 97.4  F (36.3  C) (Oral)   Ht 5' 6.5\" (1.689 m)   Wt 126 lb 11.2 oz (57.5 kg)   SpO2 99%   BMI 20.14 kg/m         VisionScreening:  No exam data present     PHYSICAL EXAM  Physical Examination: General appearance - alert, well appearing, and in no distress  Mental status - alert, oriented to person, place, and time  Eyes - pupils equal and reactive, extraocular eye movements intact  Ears - bilateral TM's and external ear canals normal  Nose - normal and patent, no erythema, discharge or polyps  Mouth - mucous membranes moist, pharynx normal without lesions  Neck - supple, no significant adenopathy  Chest - clear to auscultation, no wheezes, rales or rhonchi, symmetric air entry  Heart - normal rate, regular rhythm, normal S1, S2, no murmurs, rubs, clicks or gallops  Abdomen - soft, nontender, nondistended, no masses or organomegaly  Breasts - breasts appear normal, no suspicious masses, no skin or nipple changes or axillary nodes  Neurological - alert, oriented, normal speech, no focal findings or movement disorder noted  Extremities - peripheral pulses normal, no pedal edema, no clubbing or cyanosis, no pedal edema noted, monofilament sensory exam is normal in both feet, varicose veins noted, callus noted tip of right great toe, hammertoes present 3rd and 4th toes of both feet with overlying erythema, bunion right foot  Skin - normal " coloration and turgor, no rashes, no suspicious skin lesions noted      Assessment Results 11/14/2019   Activities of Daily Living No help needed   Instrumental Activities of Daily Living No help needed   Mini Cog Total Score -   Some recent data might be hidden     A Mini-Cog score of 0-2 suggests the possibility of dementia, score of 3-5 suggests no dementia    Identified Health Risks:     The patient was counseled and encouraged to consider modifying their diet and eating habits. She was provided with information on recommended healthy diet options.  Patient's advanced directive was discussed and I am comfortable with the patient's wishes.

## 2021-06-03 NOTE — TELEPHONE ENCOUNTER
"RN cannot approve Refill Request    RN can NOT refill this medication PCP to update sig as appropriate- requested from pharmacy different from current med list. . Last office visit: 8/1/2019 Jaz Lazar MD Last Physical: 11/14/2019 Last MTM visit: Visit date not found Last visit same specialty: 8/1/2019 Jaz Lazar MD.  Next visit within 3 mo: Visit date not found  Next physical within 3 mo: Visit date not found      Flaquito Simental, [facility] Triage/Med Refill 11/27/2019    Requested Prescriptions   Pending Prescriptions Disp Refills     pen needle, diabetic (BD ULTRA-FINE SHORT PEN NEEDLE) 31 gauge x 5/16\" Ndle [Pharmacy Med Name: B-D PEN NDL SHRT [id number](5/16) JOSE ANTONIO] 100 each 0     Sig: USE AS DIRECTED       Diabetic Supplies Refill Protocol Passed - 11/25/2019  2:19 PM        Passed - Visit with PCP or prescribing provider visit in last 6 months     Last office visit with prescriber/PCP: 8/1/2019 Jaz Lazar MD OR same dept: 8/1/2019 Jaz Lazar MD OR same specialty: 8/1/2019 Jaz Lazar MD  Last physical: 11/14/2019 Last MTM visit: Visit date not found   Next visit within 3 mo: Visit date not found  Next physical within 3 mo: Visit date not found  Prescriber OR PCP: Jaz Lazar MD  Last diagnosis associated with med order: There are no diagnoses linked to this encounter.  If protocol passes may refill for 12 months if within 3 months of last provider visit (or a total of 15 months).             Passed - A1C in last 6 months     Hemoglobin A1c   Date Value Ref Range Status   11/14/2019 6.3 (H) 3.5 - 6.0 % Final                  "

## 2021-06-04 VITALS
HEART RATE: 77 BPM | OXYGEN SATURATION: 98 % | BODY MASS INDEX: 19.81 KG/M2 | SYSTOLIC BLOOD PRESSURE: 122 MMHG | WEIGHT: 124.6 LBS | DIASTOLIC BLOOD PRESSURE: 80 MMHG

## 2021-06-04 VITALS
OXYGEN SATURATION: 99 % | BODY MASS INDEX: 19.89 KG/M2 | DIASTOLIC BLOOD PRESSURE: 90 MMHG | SYSTOLIC BLOOD PRESSURE: 130 MMHG | TEMPERATURE: 97.4 F | HEART RATE: 91 BPM | HEIGHT: 67 IN | WEIGHT: 126.7 LBS

## 2021-06-04 NOTE — PROGRESS NOTES
S: Pt. seen in [place] office. Male. Dr. Lazar. RX: Foot Orthotics 3 Pr. , DM shoes. DX: Type 2 DM, HDS, Callus formation B/L 3rd MTH.  O: Condition unchanged since initial evaluation. Pt. has not has DM shoes and FO in the past. No surgeries to date.  AG:: Pain 3/10. Pes Cavus feet. Hind and fore foot neutral. HDS on right 2-5 only. ROM within norm. 1st ray flexible. Heel rise inverted. Bilateral callus formation on 3rd MTH plantar. Pinch callus 1st ray medial bilaterally. Today I F/D Custom DM [place] FO. FO provide lift and support on medial long. arches. Will also reduce pinch callus. Met pads to offload callus formation on B/L 3rd MTH's. Custom FO are needed to accommodate Pes Cavus feet and offloading of callus formation. Pt. choose Dr. Dowell [id number] size 9.5 M shoes to accommodate DMFO. Overall fit is good. Pt. happy with fit and comfort. Donning doffing wear and care along with break in schedule given. Shoes where checked for proper fit while weight bearing in length, width and height.  P: Pt. to be seen as needed.     Wayne COUGHLIN,LO

## 2021-06-04 NOTE — TELEPHONE ENCOUNTER
Refill Approved    Rx renewed per Medication Renewal Policy. Medication was last renewed on 11/14/19.    Ev Lino, [facility] Triage/Med Refill 12/29/2019     Requested Prescriptions   Pending Prescriptions Disp Refills     insulin glargine (BASAGLAR KWIKPEN) 100 unit/mL (3 mL) pen [Pharmacy Med Name: BASAGLAR 100 U/ML KWIKPEN INJ 3ML] 15 mL 0     Sig: ADMINISTER 20 UNITS UNDER THE SKIN AT BEDTIME       Insulin/GLP-1 Refill Protocol Passed - 12/27/2019  3:12 PM        Passed - Visit with PCP or prescribing provider visit in last 6 months     Last office visit with prescriber/PCP: 8/1/2019 OR same dept: 8/1/2019 Jaz Lazar MD OR same specialty: 8/1/2019 Jaz Lazar MD Last physical: 11/14/2019 Last MTM visit: Visit date not found     Next appt within 3 mo: Visit date not found  Next physical within 3 mo: Visit date not found  Prescriber OR PCP: Jaz Lazar MD  Last diagnosis associated with med order: 1. Type 2 diabetes mellitus treated with insulin (H)  - insulin glargine (BASAGLAR KWIKPEN) 100 unit/mL (3 mL) pen [Pharmacy Med Name: BASAGLAR 100 U/ML KWIKPEN INJ 3ML]; ADMINISTER 20 UNITS UNDER THE SKIN AT BEDTIME  Dispense: 15 mL; Refill: 0    If protocol passes may refill for 6 months if within 3 months of last provider visit (or a total of 9 months).              Passed - A1C in last 6 months     Hemoglobin A1c   Date Value Ref Range Status   11/14/2019 6.3 (H) 3.5 - 6.0 % Final               Passed - Microalbumin in last year     Microalbumin, Random Urine   Date Value Ref Range Status   11/14/2019 <0.50 0.00 - 1.99 mg/dL Final                  Passed - Blood pressure in last year     BP Readings from Last 1 Encounters:   11/14/19 130/90             Passed - Creatinine done in last year     Creatinine   Date Value Ref Range Status   11/14/2019 1.09 0.60 - 1.10 mg/dL Final

## 2021-06-05 VITALS
BODY MASS INDEX: 19.38 KG/M2 | HEIGHT: 67 IN | HEART RATE: 93 BPM | TEMPERATURE: 98 F | WEIGHT: 123.5 LBS | SYSTOLIC BLOOD PRESSURE: 127 MMHG | DIASTOLIC BLOOD PRESSURE: 68 MMHG | OXYGEN SATURATION: 99 %

## 2021-06-07 NOTE — TELEPHONE ENCOUNTER
Medication Question or Clarification  Who is calling: Pharmacy  What medication are you calling about (include dose and sig)?:   insulin lispro (HUMALOG KWIKPEN INSULIN) 100 unit/mL pen  45 mL  3  4/2/2020      Sig: INJECT UP TO 50 UNITS DAILY         Who prescribed the medication?: Dr Lazar  What is your question/concern?: Pharmacy states insurance will not cover this medication. Will cover Novolog instead.  Please send new Rx for Novolog if appropriate.  Requested Pharmacy: Agnieszka Blackay to leave a detailed message?: Yes

## 2021-06-09 ENCOUNTER — RECORDS - HEALTHEAST (OUTPATIENT)
Dept: ADMINISTRATIVE | Facility: CLINIC | Age: 86
End: 2021-06-09

## 2021-06-09 NOTE — PATIENT INSTRUCTIONS - HE
[id number] th today.  Prolia 14th in 6 months with my nurse. I will see you in a year.    DXA in October 2020 .   Phone number to schedule [phone].    Daily calcium need is 2077-8513 mg a day from the diet and supplements.  Calcium citrate is easier to digest.  Vitamin D 2000 IU daily recommended.

## 2021-06-09 NOTE — PROGRESS NOTES
1. Osteoporosis Senile  DXA Bone Density Scan     Patient was educated on safety of Prolia utilizing Patient Counseling Chart for Healthcare Providers, as outlined by the Prolia REMS progam.     Return in about 6 months (around 12/18/2020) for Recheck, Prolia injection.    Patient Instructions   [id number] th today.  Prolia 14th in 6 months with my nurse. I will see you in a year.    DXA in October 2020 .   Phone number to schedule [phone].    Daily calcium need is 4546-6556 mg a day from the diet and supplements.  Calcium citrate is easier to digest.  Vitamin D 2000 IU daily recommended.      Chief Complaint   Patient presents with     Osteoporosis Follow Up     Osteo F/U       /80   Pulse 77   Wt 124 lb 9.6 oz (56.5 kg)   SpO2 98%   BMI 19.81 kg/m        Did you experience any problems with previous Prolia injection? no  Any medication change in the last 6 months? no  Did you take prednisone or other immunosupressant drugs in the last 6 months   (chemo, transplant, rheum, dermatology conditions)? no  Did you have any serious infection in the last 6 months?no  Any recent hospitalizations?no  Do you plan any dental work in the next 2-3 months?no  How much calcium do you take daily from the diet and supplements?1200 mg  How much vit D do you take daily? 2000 IU  Last DXA? 9/2018, Reviewed and discussed      Patient is here today for the 13th Prolia injection. Patient tolerated previous injections well.   We discussed calcium and vit D daily needs today.   I reviewed the labs:  Component      Latest Ref Rng & Units 11/14/2019   Sodium      136 - 145 mmol/L 141   Potassium      3.5 - 5.0 mmol/L 4.3   Chloride      98 - 107 mmol/L 106   CO2      22 - 31 mmol/L 24   Anion Gap, Calculation      5 - 18 mmol/L 11   Glucose      70 - 125 mg/dL 135 (H)   BUN      8 - 28 mg/dL 31 (H)   Creatinine      0.60 - 1.10 mg/dL 1.09   GFR MDRD Af Amer      >60 mL/min/1.73m2 58 (L)   GFR MDRD Non Af Amer      >60  mL/min/1.73m2 48 (L)   Bilirubin, Total      0.0 - 1.0 mg/dL 0.8   Calcium      8.5 - 10.5 mg/dL 10.2   Protein, Total      6.0 - 8.0 g/dL 7.3   ALBUMIN      3.5 - 5.0 g/dL 4.4   Alkaline Phosphatase      45 - 120 U/L 72   AST      0 - 40 U/L 24   ALT      0 - 45 U/L 16   Hemoglobin      12.0 - 16.0 g/dL 14.1   Vitamin D, Total (25-Hydroxy)      30.0 - 80.0 ng/mL 41.6     Next Prolia injection will be in 6 months.     Patient was educated on safety of Prolia utilizing Patient Counseling Chart for Healthcare Providers, as outlined by the Prolia REMS progam.     15 minutes spent with the patient and more then 50 % of the time in counseling about osteoporosis, available osteoporosis treatment options, medication side effects and contraindications, supplement use and weightbearing exercise.    This note has been dictated using voice recognition software. Any grammatical or context distortions are unintentional and inherent to the software      Patient Active Problem List   Diagnosis     Hyperlipidemia     Hypertension     Contact Dermatitis     Type 2 diabetes mellitus treated with insulin (H)     History of acute myocardial infarction     Osteoporosis Senile       Current Outpatient Medications on File Prior to Visit   Medication Sig Dispense Refill     amoxicillin (AMOXIL) 500 MG capsule TAKE 4 CAPSULES BY MOUTH 1 HOUR PRIOR TO DENTAL PROCEDURE. 4 capsule 3     aspirin 81 MG EC tablet Take 81 mg by mouth daily.       atorvastatin (LIPITOR) 40 MG tablet TAKE 1 TABLET BY MOUTH EVERY DAY 90 tablet 3     blood glucose meter (GLUCOMETER) Use 1 each As Directed as needed. Dispense glucometer brand per patient's insurance at pharmacy discretion. test blood sugars 4 times daily 1 each 1     blood glucose test strips Use 1 each As Directed 4 (four) times a day. Dispense brand per patient's insurance at pharmacy discretion. Test blood sugars 4 times daily 200 strip 3     calcium carbonate 1250 MG capsule Take 1,250 mg by mouth  "2 (two) times a day with meals.       cholecalciferol, vitamin D3, 1,000 unit tablet Take 1,000 Units by mouth daily.       generic lancets (ACCU-CHEK SOFTCLIX LANCETS) Dispense brand per patient's insurance at pharmacy discretion. Test blood sugars 4 times daily 200 each 3     insulin aspart U-100 (NOVOLOG FLEXPEN U-100 INSULIN) 100 unit/mL (3 mL) injection pen Inject up to 50 units daily 45 mL 3     insulin glargine (BASAGLAR KWIKPEN) 100 unit/mL (3 mL) pen ADMINISTER 20 UNITS UNDER THE SKIN AT BEDTIME 15 mL 3     lisinopril-hydrochlorothiazide (PRINZIDE,ZESTORETIC) 20-12.5 mg per tablet Take 1 tablet by mouth daily. 90 tablet 3     pen needle, diabetic (BD ULTRA-FINE SHORT PEN NEEDLE) 31 gauge x 5/16\" Ndle USE AS DIRECTED 100 each 3     triamcinolone (KENALOG) 0.1 % cream Apply sparingly to affected skin 2-3 times daily for up to 14 days. 45 g 1     [DISCONTINUED] denosumab (PROLIA) 60 mg/mL Syrg Inject 60 mg under the skin every 6 (six) months.       Current Facility-Administered Medications on File Prior to Visit   Medication Dose Route Frequency Provider Last Rate Last Dose     [START ON 7/1/2020] denosumab 60 mg (PROLIA 60 mg/ml)  60 mg Subcutaneous Q6 Months Kashif Darden MD         [DISCONTINUED] denosumab 60 mg (PROLIA 60 mg/ml)  60 mg Subcutaneous Q6 Months Kashif Darden MD         "

## 2021-06-10 NOTE — TELEPHONE ENCOUNTER
RN cannot approve Refill Request    RN can NOT refill this medication PCP messaged that patient is overdue for Labs and Office Visit. Last office visit: 8/1/2019 Jaz Lazar MD Last Physical: 11/14/2019 Last MTM visit: Visit date not found Last visit same specialty: 8/1/2019 Jaz Lazar MD.  Next visit within 3 mo: Visit date not found  Next physical within 3 mo: Visit date not found      Shala Escoto, [facility] Triage/Med Refill 8/18/2020    Requested Prescriptions   Pending Prescriptions Disp Refills     ACCU-CHEK GUIDE TEST STRIPS strips [Pharmacy Med Name: ACCU-CHEK GUIDE TEST STRIPS 100S] 400 strip 0     Sig: TEST FOUR TIMES DAILY AS DIRECTED BEFORE EACH MEAL AND BEFORE BEDTIME       Diabetic Supplies Refill Protocol Failed - 8/18/2020  8:03 AM        Failed - Visit with PCP or prescribing provider visit in last 6 months     Last office visit with prescriber/PCP: 8/1/2019 Jaz Lazar MD OR same dept: Visit date not found OR same specialty: 8/1/2019 Jaz Lazar MD  Last physical: 11/14/2019 Last MTM visit: Visit date not found   Next visit within 3 mo: Visit date not found  Next physical within 3 mo: Visit date not found  Prescriber OR PCP: Jaz Lazar MD  Last diagnosis associated with med order: 1. Type 2 diabetes mellitus without complication, with long-term current use of insulin (H)  - ACCU-CHEK GUIDE TEST STRIPS strips [Pharmacy Med Name: ACCU-CHEK GUIDE TEST STRIPS 100S]; TEST FOUR TIMES DAILY AS DIRECTED BEFORE EACH MEAL AND BEFORE BEDTIME  Dispense: 400 strip; Refill: 0    If protocol passes may refill for 12 months if within 3 months of last provider visit (or a total of 15 months).             Failed - A1C in last 6 months     Hemoglobin A1c   Date Value Ref Range Status   11/14/2019 6.3 (H) 3.5 - 6.0 % Final

## 2021-06-10 NOTE — PROGRESS NOTES
Assessment  1. Type 2 diabetes mellitus  Glycosylated Hemoglobin A1c    Microalbumin, Random Urine    Comprehensive Metabolic Panel    Lipid Cascade FASTING   2. Osteoporosis Senile     3. Hyperlipidemia     4. Essential hypertension     5. Coronary atherosclerosis         Plan    1.  Type 2 diabetes: Check A1c, urine microalbumin, conference of metabolic panel and lipid cascade today.  Continue Lantus and Humalog.  Discussed that her blood sugars are quite consistent and I do not feel she needs to be testing 4 times daily.  Okay to reduce to 3 times daily.  Continue aspirin and statin.  Blood pressure is under control.  Eye exam is up-to-date.  Plan follow-up again in 6 months.  2.  Osteoporosis: She will continue to follow up with osteoporosis care for Prolia injections.  3.  Hyperlipidemia: Continue atorvastatin.  Check lipid cascade today.  4.  Hypertension: Well-controlled with lisinopril-hydrochlorothiazide at current dose.  Check comprehensive metabolic panel.  5. Coronary atherosclerosis: History of mild heart attack many years ago.  Overall stable.  Continue aspirin and statin.  Diabetes and blood pressure are under good control.      Subjective  Kenyatta Og is a 82 y.o. female who comes in today for follow-up on type 2 diabetes.  She has been doing well.  She reports that her right forearm fracture has healed quite well.  She has some occasional discomfort with certain movements.  She follows with osteoporosis care.  Was placed on Forteo for several months but is now back on Prolia injections.  She recently received a Prolia injection.  She takes a calcium and vitamin D supplement.  She continues to monitor her blood sugars 4 times daily.  Blood sugars remain under very good control.  Her blood sugar log is reviewed today.  Readings generally range from the 80s-140s.  Occasional reading of 150 or 160.  She reports that she will have an episode of hypoglycemia about once a year.  She recognizes this  quickly.  She feels warm and slightly diaphoretic and then will eat something and symptoms will resolve quickly.  She takes Lantus at bedtime.  Uses a Lantus sliding scale.  Typically does not take more than 12-14 units of Lantus per day.  She also uses a Humalog sliding scale with meals.  She counts her carbs and adjusts insulin based on glucose reading and carb counts.  She feels very comfortable managing her diabetes.  She has history of a mild heart attack many years ago.  She remains on atorvastatin and aspirin.  Blood pressure is controlled with lisinopril-hydrochlorothiazide.  She denies any new concerns with chest pains or pressures.  No dyspnea.  No lower extremity edema.  No dizziness or lightheadedness.  No concerns with her feet.  No paresthesias in her feet.  Energy is good.  She is looking forward to going up to the [employer] for the summer.  She spends her gordon in [place].  We reviewed medications and allergies and updated the chart.  Remainder of review of systems is negative.    Current Outpatient Prescriptions   Medication Sig Dispense Refill     aspirin 81 MG EC tablet Take 81 mg by mouth daily.       atorvastatin (LIPITOR) 40 MG tablet TAKE 1 TABLET BY MOUTH EVERY DAY 90 tablet 3     blood glucose test (ONETOUCH ULTRA TEST) strips USE TO TEST FOUR TIMES DAILY AS DIRECTED 400 strip 3     blood glucose test (ONETOUCH ULTRA TEST) strips USE TO TEST FOUR TIMES DAILY AS DIRECTED 400 strip 3     calcium carbonate 1250 MG capsule Take 1,250 mg by mouth 2 (two) times a day with meals.       cholecalciferol, vitamin D3, 1,000 unit tablet Take 1,000 Units by mouth daily.       denosumab (PROLIA) 60 mg/mL Syrg Inject 60 mg under the skin every 6 (six) months.       insulin glargine (LANTUS SOLOSTAR) 100 unit/mL (3 mL) pen Inject 20 Units under the skin bedtime. 5 adj dose pen 3     insulin lispro (HUMALOG KWIKPEN) 100 unit/mL pen INJECT UP TO 50 UNITS DAILY 45 mL 3     lisinopril-hydrochlorothiazide  (PRINZIDE,ZESTORETIC) 20-12.5 mg per tablet TAKE 1 TABLET BY MOUTH EVERY DAY 90 tablet 3     amoxicillin (AMOXIL) 500 MG capsule TAKE 4 CAPSULES BY MOUTH 1 HOUR PRIOR TO DENTAL PROCEDURE. 4 capsule 1     No current facility-administered medications for this visit.          Objective   /76 (Patient Site: Left Arm, Patient Position: Sitting, Cuff Size: Adult Regular)  Pulse 82  Temp 98.9  F (37.2  C) (Tympanic)   Wt 125 lb 7 oz (56.9 kg)  SpO2 98%  BMI 19.65 kg/m2      Gen: alert, no acute distress  HEENT;  NCAT, TMs normal, nose clear, OP clear  Neck: supple, no lymphadenopathy, thyroid normal  CV: RRR, no murmur  Resp: CTAB, no wheeze/crackles  Abd: normal bowel sounds, soft, non-tender, non-distended, no masses  Ext: warm, dry, no edema, normal DP pulses, monofilament exam normal, foot exam normal

## 2021-06-10 NOTE — PROGRESS NOTES
1. Osteoporosis Senile         Return in about 6 months (around 10/18/2017) for Recheck.    Patient Instructions   Prolia 7th today.  Prolia 8th in 6 months with my nurse. I will see you in 1 year.  DXA in 9/2018 .   Phone number to schedule [phone].  Please avoid any extensive dental work as implants and teeth extractions for the next 1-2 months.  Daily calcium need is 9730-4093 mg a day from the diet and supplements.  Calcium citrate is easier to digest.  Vitamin D 2000 IU daily recommended.      Chief Complaint   Patient presents with     Osteoporosis Follow Up       /72  Pulse 82  Wt 128 lb 11.2 oz (58.4 kg)  BMI 20.16 kg/m2      Did you experience any problems with previous Prolia injection? no  Any medication change in the last 6 months? no  Did you take prednisone or other immunosupressant drugs in the last 6 months   (chemo, transplant, rheum, dermatology conditions)? no  Did you have any serious infection in the last 6 months?no  Any recent hospitalizations?no  Do you plan any dental work in the next 2-3 months?no  How much calcium do you take daily from the diet and supplements?1200 mg  How much vit D do you take daily? 2000 IU  Last DXA?9/18      Patient is here today for the 7th Prolia injection. Patient tolerated previous injections well.  She sustained right arm fracture, actually right wrist in the November 2016.  She had a complicated fracture in 3 places and 2 surgeries done.  I did treat her with Forteo from December and her last dose was a few days ago.  So she had Forteo treatment for 5 months.  Now we are planning to switch back to Prolia.  Her last bone density scan was done in September 2016.  She did take a lower calcium intake while she was in Forteo and now has to get back on a higher calcium intake because of the Prolia.  We did discuss her daily needs and I gave her printed information.  Next Prolia injection will be in 6 months.     25 minutes spent with the patient and  "more then 50 % of the time in counseling.  This note has been dictated using voice recognition software. Any grammatical or context distortions are unintentional and inherent to the software      Patient Active Problem List   Diagnosis     Hyperlipidemia     Hypertension     Contact Dermatitis     Type 2 Diabetes Mellitus     Acute Myocardial Infarction     Osteoporosis Senile       Current Outpatient Prescriptions on File Prior to Visit   Medication Sig Dispense Refill     amoxicillin (AMOXIL) 500 MG capsule TAKE 4 CAPSULES BY MOUTH 1 HOUR PRIOR TO DENTAL PROCEDURE. 4 capsule 1     aspirin 81 MG EC tablet Take 81 mg by mouth daily.       atorvastatin (LIPITOR) 40 MG tablet TAKE 1 TABLET BY MOUTH EVERY DAY 90 tablet 3     blood glucose test (ONETOUCH ULTRA TEST) strips USE TO TEST FOUR TIMES DAILY AS DIRECTED 400 strip 3     blood glucose test (ONETOUCH ULTRA TEST) strips USE TO TEST FOUR TIMES DAILY AS DIRECTED 400 strip 3     calcium carbonate 1250 MG capsule Take 1,250 mg by mouth 2 (two) times a day with meals.       cholecalciferol, vitamin D3, 1,000 unit tablet Take 1,000 Units by mouth daily.       denosumab (PROLIA) 60 mg/mL Syrg Inject 60 mg under the skin every 6 (six) months.       insulin glargine (LANTUS SOLOSTAR) 100 unit/mL (3 mL) pen Inject 20 Units under the skin bedtime. 5 adj dose pen 3     insulin lispro (HUMALOG KWIKPEN) 100 unit/mL pen INJECT UP TO 50 UNITS DAILY 45 mL 3     lisinopril-hydrochlorothiazide (PRINZIDE,ZESTORETIC) 20-12.5 mg per tablet TAKE 1 TABLET BY MOUTH EVERY DAY 90 tablet 3     [DISCONTINUED] pen needle, diabetic 31 gauge x 3/16\" Ndle Use daily with Forteo 100 each 3     [DISCONTINUED] teriparatide (FORTEO) 20 mcg/dose - 600 mcg/2.4 mL injection Inject 0.08 mL (20 mcg total) under the skin daily. 2.4 mL 11     No current facility-administered medications on file prior to visit.      "

## 2021-06-10 NOTE — PROGRESS NOTES
Chief Complaint   Patient presents with     Prolia #7     1. Did you experience any problems with previous Prolia injection? NO  2. Do you feel sick today?(fever, RS, GI,  issues)? NO  3. Any medication changes in the last 6 months? NO  4. Did you take prednisone or other immunosuppressant drugs in the last 6 months?(chemo, transplant, rheu, dermatology conditions)? NO  5. Did you have any serious infection in the last 6 months? (pancreatitis) NO  6. Any recent hospitalizations/ surgeries (especially gastric bypass, thyroid, parathyroid)? NO  7. Do you plan any dental work?(especially implants and extractions) in the next 2-3 months? NO  8. Did you have any fractures in the last year? YES. R wrist went to Ortho in [place]  10/2016.     Per Tessa BARAJAS Prolia PA is approved but  it take a few days to scan in [facility].  Next 6 months Prolia injection with a nurse visit appt is made.     Caprice Thakur, [facility] 4/27/2017 9:58 AM

## 2021-06-12 NOTE — TELEPHONE ENCOUNTER
Pt is requesting to go back on the Humalog insulin. States ever since she was changed over to the Novalog her blood sugars have been running high. Please place new RX for Humalog . Send to [facility] in [place].     Also needs pen needles

## 2021-06-12 NOTE — TELEPHONE ENCOUNTER
Who is calling:  Nurse Practitioner from [employer]   Reason for Call:  I am calling to let Jaz Lazar MD know patient has mild left leg positive PAD during quantitive flow testing  Date of last appointment with primary care: 6/18/2020  Okay to leave a detailed message: Yes

## 2021-06-13 NOTE — TELEPHONE ENCOUNTER
----- Message from Jaz Lazar MD sent at 12/9/2020 10:37 AM CST -----  Regarding: PA request  Patient was recently switched to generic Humalog.  She reports that her blood sugars have been more erratic since making this change.  Is a possible to request a PA for brand-name Humalog? she felt this was much more effective in keeping her blood sugars under control.

## 2021-06-13 NOTE — TELEPHONE ENCOUNTER
Central PA team  [phone]  Pool: HE PA MED ([id number])          PA has been initiated.       PA form completed and faxed insurance via Cover My Meds     Key:  [id number]     Medication:  HumaLOG KwikPen 100UNIT/ML pen-injectors    Insurance:  [employer]        Response will be received via fax and may take up to 5-10 business days depending on plan

## 2021-06-13 NOTE — PROGRESS NOTES
Chief Complaint   Patient presents with     PROLIA#8     1. Did you experience any problems with previous Prolia injection? NO  2. Do you feel sick today?(fever, RS, GI,  issues)? NO  3. Any medication changes in the last 6 months? NO  4. Did you take prednisone or other immunosuppressant drugs in the last 6 months?(chemo, transplant, rheu, dermatology conditions)? NO  5. Did you have any serious infection in the last 6 months? (pancreatitis) NO  6. Any recent hospitalizations/ surgeries (especially gastric bypass, thyroid, parathyroid)? NO  7. Do you plan any dental work?(especially implants and extractions) in the next 2-3 months? NO  8. Did you have any fractures in the last year? NO    Yearly F/U appointment  with Dr. Darden is made.     Caprice Thakur CMA [facility] 10/31/2017 8:55 AM

## 2021-06-13 NOTE — PROGRESS NOTES
"  Assessment and Plan:     Patient has been advised of split billing requirements and indicates understanding: Yes  Problem List Items Addressed This Visit     Hyperlipidemia    Relevant Medications    atorvastatin (LIPITOR) 40 MG tablet    Other Relevant Orders    Comprehensive Metabolic Panel    Lipid [place] FASTING    Hypertension    Relevant Medications    lisinopriL-hydrochlorothiazide (PRINZIDE,ZESTORETIC) 20-12.5 mg per tablet    Other Relevant Orders    Comprehensive Metabolic Panel    Type 2 diabetes mellitus treated with insulin (H)    Relevant Medications    insulin glargine (BASAGLAR KWIKPEN) 100 unit/mL (3 mL) pen    pen needle, diabetic (BD ULTRA-FINE SHORT PEN NEEDLE) 31 gauge x 5/16\" Ndle    insulin lispro (HUMALOG KWIKPEN INSULIN) 100 unit/mL pen    generic lancets (ACCU-CHEK SOFTCLIX LANCETS)    blood glucose test (ACCU-CHEK GUIDE TEST STRIPS) strips    Other Relevant Orders    Glycosylated Hemoglobin A1c    Microalbumin, Random Urine    Lipid [place] FASTING    Osteoporosis Senile    Relevant Orders    Vitamin D, Total (25-Hydroxy)    Chronic kidney disease, stage 3      Other Visit Diagnoses     Medicare annual wellness visit, subsequent    -  Primary    Relevant Orders    Hemoglobin (Completed)    Need for prophylactic antibiotic        Relevant Medications    amoxicillin (AMOXIL) 500 MG capsule        We will check labs today to follow-up on diabetes and other chronic medical conditions.  Encouraged her to continue to get regular vision and dental exams.  We will see if we can get prior authorization approval for brand name Humalog.    The patient's current medical problems were reviewed.    I have had an Advance Directives discussion with the patient.  The following health maintenance schedule was reviewed with the patient and provided in printed form in the after visit summary:   Health Maintenance   Topic Date Due     A1C  05/14/2020     BMP  11/14/2020     DIABETIC FOOT EXAM  11/14/2020 "     LIPID  11/14/2020     MICROALBUMIN  11/14/2020     DIABETIC EYE EXAM  11/18/2020     MEDICARE ANNUAL WELLNESS VISIT  12/09/2021     FALL RISK ASSESSMENT  12/09/2021     TD 18+ HE  08/28/2024     ADVANCE CARE PLANNING  11/14/2024     Pneumococcal Vaccine: 65+ Years  Completed     INFLUENZA VACCINE RULE BASED  Completed     ZOSTER VACCINES  Completed     Pneumococcal Vaccine: Pediatrics (0 to 5 Years) and At-Risk Patients (6 to 64 Years)  Aged Out        Subjective:   Chief Complaint: Kenyatta Og is an 86 y.o. female here for an Annual Wellness visit.   HPI: Reviewed her health history.  She is also here to follow-up on type 2 diabetes, hyperlipidemia and hypertension.  She was last seen in November of last year.  She reports no significant changes in her health since that timeframe.  She has decided that she will not go down to [place] this winter with her best friend.  They are thinking of selling their place in [place].  She has been mostly staying home and has been doing her best to stay healthy during the pandemic.  She continues to follow with osteoporosis care and continues with Prolia injections.  She is scheduled for a bone density scan and Prolia later this month.  She continues on calcium and vitamin D supplement.  She continues to monitor her blood sugar several times a day.  Blood sugars remain under good control overall although she has noticed some fluctuation in her blood sugars recently when she had to switch from Humalog to NovoLog.  Lately she has noticed the blood sugars have been more level but she is concerned that her A1c may be a little bit higher today.  She reports no significant hypoglycemia.  Continues on Basaglar sliding scale at bedtime.  Typically does not take more than 12 to 15 units of Basaglar per day.  Uses Humalog sliding scale with meals.  Counts her carbs and adjust her insulin based on glucose reading and carb counts.  Continues to feel very comfortable managing her  diabetes.  She has a history of a mild heart attack many years ago.  She remains on atorvastatin and aspirin.  Blood pressure is well controlled with lisinopril-hydrochlorothiazide.  She denies any new concerns with chest pain or pressure.  No dyspnea.  No lower extremity edema.  She does report that she will occasionally have a cool sensation along her left leg.  No pain associated with this.  No back problems.  She denies dizziness and lightheadedness.  Energy is good.  She continues to walk nearly every day for exercise.  She has not had vision or dental exam since the pandemic began.  Admits that she is nervous about going in for that.  We reviewed medications, allergies, family and social history and updated the chart.  Review of systems is assessed and is otherwise negative.  No other concerns or questions today.    Review of Systems:    Please see above.  The rest of the review of systems are negative for all systems.    Patient Care Team:  Jaz Lazar MD as PCP - Noel Peña MD as Physician (Orthopedic Surgery)  Kashif Darden MD as Physician (Internal Medicine)  Kashif Darden MD as Assigned PCP     Patient Active Problem List   Diagnosis     Hyperlipidemia     Hypertension     Contact Dermatitis     Type 2 diabetes mellitus treated with insulin (H)     History of acute myocardial infarction     Osteoporosis Senile     No past medical history on file.   Past Surgical History:   Procedure Laterality Date     MD REMOVAL GALLBLADDER      Description: Cholecystectomy;  Recorded: 02/29/2008;      No family history on file.   Social History     Socioeconomic History     Marital status:      Spouse name: Not on file     Number of children: Not on file     Years of education: Not on file     Highest education level: Not on file   Occupational History     Not on file   Social Needs     Financial resource strain: Not on file     Food insecurity     Worry: Not on file     Inability:  Not on file     Transportation needs     Medical: Not on file     Non-medical: Not on file   Tobacco Use     Smoking status: Never Smoker     Smokeless tobacco: Never Used   Substance and Sexual Activity     Alcohol use: No     Drug use: No     Sexual activity: Never   Lifestyle     Physical activity     Days per week: Not on file     Minutes per session: Not on file     Stress: Not on file   Relationships     Social connections     Talks on phone: Not on file     Gets together: Not on file     Attends Yarsanism service: Not on file     Active member of club or organization: Not on file     Attends meetings of clubs or organizations: Not on file     Relationship status: Not on file     Intimate partner violence     Fear of current or ex partner: Not on file     Emotionally abused: Not on file     Physically abused: Not on file     Forced sexual activity: Not on file   Other Topics Concern     Not on file   Social History Narrative     Not on file      Current Outpatient Medications   Medication Sig Dispense Refill     ACCU-CHEK GUIDE TEST STRIPS strips TEST FOUR TIMES DAILY AS DIRECTED BEFORE EACH MEAL AND BEFORE BEDTIME 400 strip 3     aspirin 81 MG EC tablet Take 81 mg by mouth daily.       atorvastatin (LIPITOR) 40 MG tablet TAKE 1 TABLET BY MOUTH EVERY DAY 90 tablet 3     blood glucose meter (GLUCOMETER) Use 1 each As Directed as needed. Dispense glucometer brand per patient's insurance at pharmacy discretion. test blood sugars 4 times daily 1 each 1     calcium carbonate 1250 MG capsule Take 1,250 mg by mouth 2 (two) times a day with meals.       cholecalciferol, vitamin D3, 1,000 unit tablet Take 1,000 Units by mouth daily.       generic lancets (ACCU-CHEK SOFTCLIX LANCETS) Dispense brand per patient's insurance at pharmacy discretion. Test blood sugars 4 times daily 200 each 3     insulin glargine (BASAGLAR KWIKPEN) 100 unit/mL (3 mL) pen ADMINISTER 20 UNITS UNDER THE SKIN AT BEDTIME 15 mL 3     insulin  "lispro (HUMALOG KWIKPEN INSULIN) 100 unit/mL pen Inject up to 50 units daily 45 mL 3     lisinopril-hydrochlorothiazide (PRINZIDE,ZESTORETIC) 20-12.5 mg per tablet Take 1 tablet by mouth daily. 90 tablet 3     pen needle, diabetic (BD ULTRA-FINE SHORT PEN NEEDLE) 31 gauge x 5/16\" Ndle USE AS DIRECTED 100 each 3     triamcinolone (KENALOG) 0.1 % cream Apply sparingly to affected skin 2-3 times daily for up to 14 days. 45 g 1     amoxicillin (AMOXIL) 500 MG capsule TAKE 4 CAPSULES BY MOUTH 1 HOUR PRIOR TO DENTAL PROCEDURE. 4 capsule 3     Current Facility-Administered Medications   Medication Dose Route Frequency Provider Last Rate Last Admin     denosumab 60 mg (PROLIA 60 mg/ml)  60 mg Subcutaneous Q6 Months Kashif Darden MD   60 mg at 06/18/20 1138      Objective:   Vital Signs:   Visit Vitals  /68 (Patient Site: Left Arm, Patient Position: Sitting, Cuff Size: Adult Regular)   Pulse 93   Temp 98  F (36.7  C) (Tympanic)   Ht 5' 6.5\" (1.689 m)   Wt 123 lb 8 oz (56 kg)   SpO2 99%   BMI 19.63 kg/m           VisionScreening:  No exam data present     PHYSICAL EXAM  Physical Examination: General appearance - alert, well appearing, and in no distress  Mental status - alert, oriented to person, place, and time  Eyes - pupils equal and reactive, extraocular eye movements intact  Ears - bilateral TM's and external ear canals normal  Neck - supple, no significant adenopathy  Chest - clear to auscultation, no wheezes, rales or rhonchi, symmetric air entry  Heart - normal rate, regular rhythm, normal S1, S2, no murmurs, rubs, clicks or gallops  Abdomen - soft, nontender, nondistended, no masses or organomegaly  Breasts - breasts appear normal, no suspicious masses, no skin or nipple changes or axillary nodes  Neurological - alert, oriented, normal speech, no focal findings or movement disorder noted  Extremities - peripheral pulses normal, no pedal edema, no clubbing or cyanosis  Diabetic foot exam: normal DP and PT " pulses, no trophic changes or ulcerative lesions, normal sensory exam and .  There is callus formation present along the great toes bilaterally.  There is hammertoes of the third and fourth toe of the right foot.  Ganglion cyst present over fourth toe of right foot      Assessment Results 12/9/2020   Activities of Daily Living No help needed   Instrumental Activities of Daily Living No help needed   Mini Cog Total Score 5   Some recent data might be hidden     A Mini-Cog score of 0-2 suggests the possibility of dementia, score of 3-5 suggests no dementia    Identified Health Risks:     Patient's advanced directive was discussed and I am comfortable with the patient's wishes.

## 2021-06-13 NOTE — PROGRESS NOTES
Assessment and Plan:       1. Need for immunization against influenza    - Influenza High Dose, Seasonal 65+ yrs    2. HTN (hypertension)  -Well-controlled with current medication  - lisinopril-hydrochlorothiazide (PRINZIDE,ZESTORETIC) 20-12.5 mg per tablet; TAKE 1 TABLET BY MOUTH EVERY DAY  Dispense: 90 tablet; Refill: 3  - Comprehensive Metabolic Panel    3. Hyperlipidemia    - atorvastatin (LIPITOR) 40 MG tablet; TAKE 1 TABLET BY MOUTH EVERY DAY  Dispense: 90 tablet; Refill: 3  - Lipid [place] FASTING  - Comprehensive Metabolic Panel    4. Type 2 diabetes mellitus  Blood sugars are excellent.  No hypoglycemic events noted.  Continue aspirin 81 mg daily.  Continue atorvastatin 40 mg daily.  She is on lisinopril.  Blood pressure is at goal.  Check A1c and lipid cascade today.  Follow-up again in 6 months.  Continue Lantus and Humalog sliding scales.  - blood glucose test (ONETOUCH ULTRA TEST) strips; USE TO TEST FOUR TIMES DAILY AS DIRECTED  Dispense: 400 strip; Refill: 3  - Lipid [place] FASTING  - Glycosylated Hemoglobin A1c    5. Osteoporosis Senile  She gets Prolia injections every 6 months  - Vitamin D, Total (25-Hydroxy)    6. Coronary atherosclerosis  History of mild heart attack many years ago.  Overall stable.  Continue aspirin and statin.  Diabetes and blood pressure are under good control.      The patient's current medical problems were reviewed.      The following health maintenance schedule was reviewed with the patient and provided in printed form in the after visit summary:   Health Maintenance   Topic Date Due     DIABETES OPHTHALMOLOGY EXAM  10/20/1944     DIABETES HEMOGLOBIN A1C  11/04/2017     FALL RISK ASSESSMENT  12/06/2017     DIABETES FOLLOW-UP  05/02/2018     DIABETES URINE MICROALBUMIN  05/04/2018     DXA SCAN  09/22/2018     DIABETES FOOT EXAM  11/02/2018     ADVANCE DIRECTIVES DISCUSSED WITH PATIENT  11/02/2022     TD 18+ HE  08/28/2024     PNEUMOCOCCAL POLYSACCHARIDE VACCINE AGE 65  AND OVER  Completed     INFLUENZA VACCINE RULE BASED  Completed     PNEUMOCOCCAL CONJUGATE VACCINE FOR ADULTS (PCV13 OR PREVNAR)  Completed     ZOSTER VACCINE  Completed        Subjective:   Chief Complaint: Kenyatta Og is an 83 y.o. female here for an Annual Wellness visit.   HPI: She is also here to follow-up on type 2 diabetes.  Has been doing well since her last office visit in May.  Continues to follow with osteoporosis care.  Recently received a Prolia injection.  Continues on a calcium and vitamin D supplement.  She continues to monitor her blood sugars 4 times daily.  These readings are reviewed.  Blood sugars remain under very good control.  Readings generally range from the 80s-140s.  No significant hypoglycemia.  Continues on Lantus sliding scale at bedtime.  Typically does not take more than 12-14 units of Lantus per day.  Also uses Humalog sliding scale with meals.  Counts her carbs and adjust insulin based on glucose reading and carb counts.  She feels very comfortable with managing her diabetes.  Has history of mild heart attack many years ago.  Remains on atorvastatin and aspirin.  Blood pressure is well-controlled with lisinopril-hydrochlorothiazide.  Denies any new concerns with chest pain or pressure.  No dyspnea.  No lower extremity edema.  No dizziness or lightheadedness.  No paresthesias in her feet.  Does notice her fingers get a little bit tingly when she reaches into the freezer.  Energy is good.  Overall feels very well.  Looking forward to going to [place] for the winter.  Will leave after Thanksgiving.  We reviewed medications and allergies.  Review of systems is otherwise negative.    Review of Systems:   Please see above.  The rest of the review of systems are negative for all systems.    Patient Care Team:  Jaz Lazar MD as PCP - General  Noel Enrique MD as Physician (Orthopedic Surgery)  Kashif Darden MD as Physician (Internal Medicine)     Patient Active Problem  List   Diagnosis     Hyperlipidemia     Hypertension     Contact Dermatitis     Type 2 diabetes mellitus treated with insulin     Acute Myocardial Infarction     Osteoporosis Senile     History reviewed. No pertinent past medical history.   Past Surgical History:   Procedure Laterality Date     DC REMOVAL GALLBLADDER      Description: Cholecystectomy;  Recorded: 02/29/2008;      History reviewed. No pertinent family history.   Social History     Social History     Marital status:      Spouse name: N/A     Number of children: N/A     Years of education: N/A     Occupational History     Not on file.     Social History Main Topics     Smoking status: Never Smoker     Smokeless tobacco: Never Used     Alcohol use No     Drug use: No     Sexual activity: No     Other Topics Concern     Not on file     Social History Narrative      Current Outpatient Prescriptions   Medication Sig Dispense Refill     aspirin 81 MG EC tablet Take 81 mg by mouth daily.       atorvastatin (LIPITOR) 40 MG tablet TAKE 1 TABLET BY MOUTH EVERY DAY 90 tablet 3     blood glucose test (ONETOUCH ULTRA TEST) strips USE TO TEST FOUR TIMES DAILY AS DIRECTED 400 strip 3     blood glucose test (ONETOUCH ULTRA TEST) strips USE TO TEST FOUR TIMES DAILY AS DIRECTED 400 strip 3     calcium carbonate 1250 MG capsule Take 1,250 mg by mouth 2 (two) times a day with meals.       cholecalciferol, vitamin D3, 1,000 unit tablet Take 1,000 Units by mouth daily.       denosumab (PROLIA) 60 mg/mL Syrg Inject 60 mg under the skin every 6 (six) months.       insulin glargine (LANTUS SOLOSTAR) 100 unit/mL (3 mL) pen Inject 20 Units under the skin at bedtime. 5 adj dose pen 3     insulin lispro (HUMALOG KWIKPEN) 100 unit/mL pen INJECT UP TO 50 UNITS DAILY 45 mL 3     lisinopril-hydrochlorothiazide (PRINZIDE,ZESTORETIC) 20-12.5 mg per tablet TAKE 1 TABLET BY MOUTH EVERY DAY 90 tablet 3     amoxicillin (AMOXIL) 500 MG capsule TAKE 4 CAPSULES BY MOUTH 1 HOUR PRIOR TO  "DENTAL PROCEDURE. 4 capsule 3     No current facility-administered medications for this visit.       Objective:   Vital Signs:   Visit Vitals     /80 (Patient Site: Left Arm, Patient Position: Sitting, Cuff Size: Adult Regular)     Pulse 94     Temp 97.9  F (36.6  C) (Tympanic)     Ht 5' 7\" (1.702 m)     Wt 127 lb 7 oz (57.8 kg)     SpO2 99%     BMI 19.96 kg/m2        VisionScreening:  No exam data present     PHYSICAL EXAM  General appearance: alert, appears stated age and cooperative  Head: Normocephalic, without obvious abnormality, atraumatic  Eyes: negative  Ears: normal TM's and external ear canals both ears  Nose: Nares normal. Septum midline. Mucosa normal. No drainage or sinus tenderness.  Throat: lips, mucosa, and tongue normal; teeth and gums normal  Neck: no adenopathy and no carotid bruit  Lungs: clear to auscultation bilaterally  Heart: regular rate and rhythm, S1, S2 normal, no murmur, click, rub or gallop  Abdomen: soft, non-tender; bowel sounds normal; no masses,  no organomegaly  Extremities: extremities normal, atraumatic, no cyanosis or edema and Diminished dorsalis pedis pulse present on the right side.  Monofilament exam is normal.  Foot examination shows a callus at the tip of the right great toe and the plantar surface of the left foot.  Neurologic: Grossly normal    Assessment Results 11/2/2017   Activities of Daily Living No help needed   Instrumental Activities of Daily Living No help needed   Mini Cog Total Score 5   Some recent data might be hidden     A Mini-Cog score of 0-2 suggests the possibility of dementia, score of 3-5 suggests no dementia    Identified Health Risks:     She is at risk for falling and has been provided with information to reduce the risk of falling at home.  Patient's advanced directive was discussed and I am comfortable with the patient's wishes.        "

## 2021-06-13 NOTE — PATIENT INSTRUCTIONS - HE
Advance Directive  Patient s advance directive was discussed and I am comfortable with the patient s wishes.  Patient Education   Personalized Prevention Plan  You are due for the preventive services outlined below.  Your care team is available to assist you in scheduling these services.  If you have already completed any of these items, please share that information with your care team to update in your medical record.  Health Maintenance   Topic Date Due     A1C  05/14/2020     BMP  11/14/2020     DIABETIC FOOT EXAM  11/14/2020     LIPID  11/14/2020     MICROALBUMIN  11/14/2020     DIABETIC EYE EXAM  11/18/2020     MEDICARE ANNUAL WELLNESS VISIT  12/09/2021     FALL RISK ASSESSMENT  12/09/2021     TD 18+ HE  08/28/2024     ADVANCE CARE PLANNING  11/14/2024     Pneumococcal Vaccine: 65+ Years  Completed     INFLUENZA VACCINE RULE BASED  Completed     ZOSTER VACCINES  Completed     Pneumococcal Vaccine: Pediatrics (0 to 5 Years) and At-Risk Patients (6 to 64 Years)  Aged Out

## 2021-06-14 NOTE — TELEPHONE ENCOUNTER
Filling can be done at any time. Only tooth extraction and implants can potentially bring some problem in the jaw. She should inform her dentist that she is on Prolia.

## 2021-06-14 NOTE — TELEPHONE ENCOUNTER
Reason for Call:  Other call back      Detailed comments: Patient had a Prolia shot yesterday and is wondering if she can have dental work done soon.  Her filling came out in one of her front teeth and she doesn't know if there will be drilling involved.  Please advise.    Phone Number Patient can be reached at: Home number on file [phone] (home)    Best Time: Any    Can we leave a detailed message on this number?: Yes    Call taken on 1/14/2021 at 8:52 AM by Tasha Rothman

## 2021-06-14 NOTE — PROGRESS NOTES
"Prolia Injection Phone Screen      Screening questions have been asked 2-3 days prior to administration visit for Prolia. If any questions are answered with \"Yes,\" this phone encounter were will routed to ordering provider for further evaluation.     1.  When was the last injection?  6/18/20    2.  Has insurance for this injection been verified?  Yes    3.  Did you experience any new onset achiness or rashes that lasted for over a month with your previous Prolia injection?   No    4.  Do you have a fever over 101?F or a new deep cough that is unusual for you today? No    5.  Have you started any new medications in the last 6 months that you were told could affect your immune system? These may have been prescribed by oncologist, transplant, rheumatology, or dermatology.   No    6.  In the last 6 months have you have gastric bypass or parathyroid surgery?   No    7.  Do you plan dental work requiring drilling into the bone such as implants/extractions or oral surgery in the next 2-3 months?   No    8. Do you have new insurance since the last injection?    Patient informed if symptoms discussed above present prior to their administration appointment, they are to notify clinic immediately.     Lisa M Bloch              "

## 2021-06-16 PROBLEM — N18.30 CHRONIC KIDNEY DISEASE, STAGE 3 (H): Status: ACTIVE | Noted: 2020-12-09

## 2021-06-16 NOTE — TELEPHONE ENCOUNTER
RN cannot approve Refill Request    RN can NOT refill this medication medication not on med list. Last office visit: 8/1/2019 Jaz Lazar MD Last Physical: 12/9/2020 Last MTM visit: Visit date not found Last visit same specialty: 8/1/2019 Jaz Lazar MD.  Next visit within 3 mo: Visit date not found  Next physical within 3 mo: Visit date not found      Susan Gonzalez, [facility] Triage/Med Refill 3/20/2021    Requested Prescriptions   Pending Prescriptions Disp Refills     insulin glargine (BASAGLAR KWIKPEN) 100 unit/mL (3 mL) pen 15 mL 3     Sig: ADMINISTER 20 UNITS UNDER THE SKIN AT BEDTIME       Insulin/GLP-1 Refill Protocol Passed - 3/19/2021 12:27 PM        Passed - Visit with PCP or prescribing provider visit in last 6 months     Last office visit with prescriber/PCP: Visit date not found OR same dept: Visit date not found OR same specialty: 8/1/2019 Jaz Lazar MD Last physical: 12/9/2020 Last MTM visit: Visit date not found     Next appt within 3 mo: Visit date not found  Next physical within 3 mo: Visit date not found  Prescriber OR PCP: Jaz Lazar MD  Last diagnosis associated with med order: 1. Type 2 diabetes mellitus treated with insulin (H)  - insulin glargine (BASAGLAR KWIKPEN) 100 unit/mL (3 mL) pen; ADMINISTER 20 UNITS UNDER THE SKIN AT BEDTIME  Dispense: 15 mL; Refill: 3    If protocol passes may refill for 6 months if within 3 months of last provider visit (or a total of 9 months).              Passed - A1C in last 6 months     Hemoglobin A1c   Date Value Ref Range Status   12/09/2020 6.1 (H) <=5.6 % Final               Passed - Microalbumin in last year     Microalbumin, Random Urine   Date Value Ref Range Status   12/09/2020 1.56 0.00 - 1.99 mg/dL Final                  Passed - Blood pressure in last year     BP Readings from Last 1 Encounters:   12/09/20 127/68             Passed - Creatinine done in last year     Creatinine   Date Value Ref Range Status   12/09/2020 1.09  0.60 - 1.10 mg/dL Final

## 2021-06-17 NOTE — PATIENT INSTRUCTIONS - HE
Patient Instructions by Jaz Lazar MD at 11/14/2019  9:20 AM     Author: Jaz Lazar MD Service: -- Author Type: Physician    Filed: 11/14/2019  9:59 AM Encounter Date: 11/14/2019 Status: Addendum    : Jaz Lazar MD (Physician)    Related Notes: Original Note by Jaz Lazar MD (Physician) filed at 11/14/2019  9:35 AM       Consider meeting with diabetic educator if you keep having trouble with your meter    I recommend that you get the shingles vaccine at your pharmacy      Patient Education   Understanding USDA MyPlate  The USDA (US Department of Agriculture) has guidelines to help you make healthy food choices. These are called MyPlate. MyPlate shows the food groups that make up healthy meals using the image of a place setting. Before you eat, think about the healthiest choices for what to put onto your plate or into your cup or bowl. To learn more about building a healthy plate, visit www.choosemyplate.gov.       The Food Groups    Fruits: Any fruit or 100% fruit juice counts as part of the Fruit Group. Fruits may be fresh, canned, frozen, or dried, and may be whole, cut-up, or pureed. Make half your plate fruits and vegetables.    Vegetables: Any vegetable or 100% vegetable juice counts as a member of the Vegetable Group. Vegetables may be fresh, frozen, canned, or dried. They can be served raw or cooked and may be whole, cut-up, or mashed. Make half your plate fruits and vegetables.     Grains: All foods made from grains are part of the Grains Group. These include wheat, rice, oats, cornmeal, and barley such as bread, pasta, oatmeal, cereal, tortillas, and grits. Grains should be no more than a quarter of your plate. At least half of your grains should be whole grains.    Protein: This group includes meat, poultry, seafood, beans and peas, eggs, processed soy products (like tofu), nuts (including nut butters), and seeds. Make protein choices no more than a quarter of your plate. Meat and  poultry choices should be lean or low fat.    Dairy: All fluid milk products and foods made from milk that contain calcium, like yogurt and cheese are part of the Dairy Group. (Foods that have little calcium, such as cream, butter, and cream cheese, are not part of the group.) Most dairy choices should be low-fat or fat-free.    Oils: These are fats that are liquid at room temperature. They include canola, corn, olive, soybean, and sunflower oil. Foods that are mainly oil include mayonnaise, certain salad dressings, and soft margarines. You should have only 5 to 7 teaspoons of oils a day. You probably already get this much from the food you eat.  Use [employer] to Help Build Your Meals  The [employer] can help you plan and track your meals and activity. You can look up individual foods to see or compare their nutritional value. You can get guidelines for what and how much you should eat. You can compare your food choices. And you can assess personal physical activities and see ways you can improve. Go to www.Basic6.gov/OpenLogiccker/.    6576-8870 [employer]. [address] [place] [address]. All rights reserved. This information is not intended as a substitute for professional medical care. Always follow your healthcare professional's instructions.             Advance Directive  Patients advance directive was discussed and I am comfortable with the patients wishes.  Patient Education   Personalized Prevention Plan  You are due for the preventive services outlined below.  Your care team is available to assist you in scheduling these services.  If you have already completed any of these items, please share that information with your care team to update in your medical record.  Health Maintenance   Topic Date Due   ? ZOSTER VACCINES (2 of 3) 12/09/2014   ? A1C  05/08/2019   ? INFLUENZA VACCINE RULE BASED (1) 08/01/2019   ? DIABETIC EYE EXAM  10/01/2019   ? BMP  11/08/2019   ? DIABETIC  FOOT EXAM  11/08/2019   ? LIPID  11/08/2019   ? MICROALBUMIN  11/08/2019   ? FALL RISK ASSESSMENT  11/08/2019   ? MEDICARE ANNUAL WELLNESS VISIT  11/08/2019   ? DXA SCAN  09/25/2020   ? ADVANCE CARE PLANNING  11/08/2023   ? TD 18+ HE  08/28/2024   ? PNEUMOCOCCAL IMMUNIZATION 65+ LOW/MEDIUM RISK  Completed

## 2021-06-17 NOTE — TELEPHONE ENCOUNTER
Telephone Encounter by Jessica Hernandez at 12/11/2020  1:02 PM     Author: Jessica Hernandez Service: -- Author Type: --    Filed: 12/11/2020  1:02 PM Encounter Date: 12/9/2020 Status: Signed    : Jessica Hernandez       Per insurance PA is not needed, pharmacy can process medication for brand name without issue.

## 2021-06-17 NOTE — PROGRESS NOTES
1. Osteoporosis Senile  DXA Bone Density Scan    denosumab 60 mg (PROLIA 60 mg/ml)       Return in about 6 months (around 11/10/2018) for Recheck.    Patient Instructions   Prolia 9th today.  Prolia 10th in 6 months with my nurse. I will see you in 1 year.    DXA in 9/2018 .   Phone number to schedule [phone].    Daily calcium need is 7985-4791 mg a day from the diet and supplements.  Calcium citrate is easier to digest.  Vitamin D 2000 IU daily recommended.      Chief Complaint   Patient presents with     Osteoporosis Follow Up       /82  Pulse 66  Wt 129 lb 14.4 oz (58.9 kg)  BMI 20.35 kg/m2      Did you experience any problems with previous Prolia injection? no  Any medication change in the last 6 months? no  Did you take prednisone or other immunosupressant drugs in the last 6 months   (chemo, transplant, rheum, dermatology conditions)? no  Did you have any serious infection in the last 6 months?no  Any recent hospitalizations?no  Do you plan any dental work in the next 2-3 months?no  How much calcium do you take daily from the diet and supplements?1200 mg  How much vit D do you take daily? 2000 IU  Last DXA?9/2016      Patient is here today for the 9th Prolia injection. Patient tolerated previous injections well.   We discussed calcium and vit D daily needs today.   Next Prolia injection will be in 6 months.     15 minutes spent with the patient and more then 50 % of the time in counseling.  This note has been dictated using voice recognition software. Any grammatical or context distortions are unintentional and inherent to the software      Patient Active Problem List   Diagnosis     Hyperlipidemia     Hypertension     Contact Dermatitis     Type 2 diabetes mellitus treated with insulin     Acute Myocardial Infarction     Osteoporosis Senile       Current Outpatient Prescriptions on File Prior to Visit   Medication Sig Dispense Refill     amoxicillin (AMOXIL) 500 MG capsule TAKE 4 CAPSULES BY  MOUTH 1 HOUR PRIOR TO DENTAL PROCEDURE. 4 capsule 3     aspirin 81 MG EC tablet Take 81 mg by mouth daily.       atorvastatin (LIPITOR) 40 MG tablet TAKE 1 TABLET BY MOUTH EVERY DAY 90 tablet 3     blood glucose test (ONETOUCH ULTRA TEST) strips USE TO TEST FOUR TIMES DAILY AS DIRECTED 400 strip 3     blood glucose test (ONETOUCH ULTRA TEST) strips USE TO TEST FOUR TIMES DAILY AS DIRECTED 400 strip 3     calcium carbonate 1250 MG capsule Take 1,250 mg by mouth 2 (two) times a day with meals.       cholecalciferol, vitamin D3, 1,000 unit tablet Take 1,000 Units by mouth daily.       denosumab (PROLIA) 60 mg/mL Syrg Inject 60 mg under the skin every 6 (six) months.       insulin glargine (LANTUS; BASAGLAR) 100 unit/mL (3 mL) pen Inject 20 Units under the skin at bedtime. 5 adj dose pen 3     insulin lispro (HUMALOG KWIKPEN) 100 unit/mL pen INJECT UP TO 50 UNITS DAILY 45 mL 3     lisinopril-hydrochlorothiazide (PRINZIDE,ZESTORETIC) 20-12.5 mg per tablet TAKE 1 TABLET BY MOUTH EVERY DAY 90 tablet 3     No current facility-administered medications on file prior to visit.

## 2021-06-19 NOTE — LETTER
"Letter by Jaz Lazar MD at      Author: Jaz Lazar MD Service: -- Author Type: --    Filed:  Encounter Date: 11/15/2019 Status: Signed         Kenyatta Og  [address] [place] [id number]             November 15, 2019         Dear Ms. Lenka,    Below are the results from your recent visit:    Resulted Orders   Microalbumin, Random Urine   Result Value Ref Range    Microalbumin, Random Urine <0.50 0.00 - 1.99 mg/dL    Creatinine, Urine 73.8 mg/dL    Microalbumin/Creatinine Ratio Random Urine        Comment:      \"Unable to calculate: Creatinine and/or Microalbumin value below detectable level\"    Narrative    Microalbumin, Random Urine  <2.0 mg/dL . . . . . . . . Normal  3.0-30.0 mg/dL . . . . . . Microalbuminuria  >30.0 mg/dL . . . . . .  . Clinical Proteinuria    Microalbumin/Creatinine Ratio, Random Urine  <20 mg/g . . . . .. . . . Normal   mg/g . . . . . . . Microalbuminuria  >300 mg/g . . . . . . . . Clinical Proteinuria       Lipid Cascade FASTING   Result Value Ref Range    Cholesterol 173 <=199 mg/dL    Triglycerides 106 <=149 mg/dL    HDL Cholesterol 71 >=50 mg/dL    LDL Calculated 81 <=129 mg/dL    Patient Fasting > 8hrs? Yes    Glycosylated Hemoglobin A1c   Result Value Ref Range    Hemoglobin A1c 6.3 (H) 3.5 - 6.0 %   Comprehensive Metabolic Panel   Result Value Ref Range    Sodium 141 136 - 145 mmol/L    Potassium 4.3 3.5 - 5.0 mmol/L    Chloride 106 98 - 107 mmol/L    CO2 24 22 - 31 mmol/L    Anion Gap, Calculation 11 5 - 18 mmol/L    Glucose 135 (H) 70 - 125 mg/dL    BUN 31 (H) 8 - 28 mg/dL    Creatinine 1.09 0.60 - 1.10 mg/dL    GFR MDRD Af Amer 58 (L) >60 mL/min/1.73m2    GFR MDRD Non Af Amer 48 (L) >60 mL/min/1.73m2    Bilirubin, Total 0.8 0.0 - 1.0 mg/dL    Calcium 10.2 8.5 - 10.5 mg/dL    Protein, Total 7.3 6.0 - 8.0 g/dL    Albumin 4.4 3.5 - 5.0 g/dL    Alkaline Phosphatase 72 45 - 120 U/L    AST 24 0 - 40 U/L    ALT 16 0 - 45 U/L    Narrative    Fasting Glucose reference " range is 70-99 mg/dL per  American Diabetes Association (ADA) guidelines.   Hemoglobin   Result Value Ref Range    Hemoglobin 14.1 12.0 - 16.0 g/dL   Vitamin D, Total (25-Hydroxy)   Result Value Ref Range    Vitamin D, Total (25-Hydroxy) 41.6 30.0 - 80.0 ng/mL    Narrative    Deficiency <10.0 ng/mL  Insufficiency 10.0-29.9 ng/mL  Sufficiency 30.0-80.0 ng/mL  Toxicity (possible) >100.0 ng/mL   Thyroid Stimulating Hormone (TSH)   Result Value Ref Range    TSH 1.23 0.30 - 5.00 uIU/mL       Your A1c is up a little bit but overall, your diabetes remains under good control. The rest of your bloodwork looks very good. Cholesterol levels are good. Thyroid level is normal.  Kidney and liver function tests are okay.     Please call with questions or contact us using [email or web address].    Sincerely,        Electronically signed by Jaz Lzaar MD

## 2021-06-21 NOTE — PROGRESS NOTES
The following steps were completed to comply with the REMS program for Prolia:  1. Ordering provider has previously reviewed information in the Medication Guide and Patient Counseling Chart, including the serious risks of Prolia  and the symptoms of each risk and have been advised to seek prompt medical attention if they have signs or symptoms of any of the serious risks.  2. Provided each patient a copy of the Medication Guide and Patient Brochure.  See MAR for administration details.   Indication: Prolia  (denosumab) is a prescription medicine used to treat osteoporosis in patients who:   Are at high risk for fracture, meaning patients who have had a fracture related to osteoporosis, or who have multiple risk factors for fracture; Cannot use another osteoporosis medicine or other osteoporosis medicines did not work well.   The timeline for early/late injections would be 4 weeks early and any time after the 6 month yamel. If a patient receives their injection late, then the subsequent injection would be 6 months from the date that they actually received the injection    Have the screening questions been asked prior to this administration? Yes     Patient was wondering about dental work. She states she doesn't have anything planned but was wondering. I directed her to read the patient information that was handed out at the appt and to let her dentist know she was on Prolia.

## 2021-06-21 NOTE — PROGRESS NOTES
"  Assessment and Plan:       Kenyatta was seen today for annual wellness visit.    Routine general medical examination at a health care facility  -     Hemoglobin  -Discussed the new shingles vaccine that is available.  Encouraged her to check with insurance regarding coverage  Vaccines otherwise up-to-date-  Continue with regular exercise, healthy diet.-    Type 2 diabetes mellitus treated with insulin (H)  -     Glycosylated Hemoglobin A1c  -     Microalbumin, Random Urine  -Blood sugars are excellent.  No hypoglycemic events noted.  Continue aspirin 81 mg daily.  Continue atorvastatin 40 mg daily.  Continue lisinopril.  Blood pressure at goal.  Check A1c and lipids today.  Follow-up again in 6 months.  Continue Lantus and Humalog sliding scales    Mixed hyperlipidemia  -     Lipid Profile  -     atorvastatin (LIPITOR) 40 MG tablet; TAKE 1 TABLET BY MOUTH EVERY DAY  -     Comprehensive Metabolic Panel    Health care maintenance  -     Influenza High Dose, Seasonal 65+ yrs    Osteoporosis Senile  -     Vitamin D, Total (25-Hydroxy)  -She continues to follow with osteoporosis care and will receive Prolia injection next week    Essential hypertension  -     lisinopril-hydrochlorothiazide (PRINZIDE,ZESTORETIC) 20-12.5 mg per tablet; TAKE 1 TABLET BY MOUTH EVERY DAY  -     Comprehensive Metabolic Panel  -Controlled with current medication    Other orders  -     amoxicillin (AMOXIL) 500 MG capsule; TAKE 4 CAPSULES BY MOUTH 1 HOUR PRIOR TO DENTAL PROCEDURE.  -     insulin glargine (LANTUS; BASAGLAR) 100 unit/mL (3 mL) pen; Inject up to 20 units SC at bedtime  -     insulin lispro (HUMALOG KWIKPEN INSULIN) 100 unit/mL pen; INJECT UP TO 50 UNITS DAILY  -     pen needle, diabetic 31 gauge x 5/16\" Ndle; Use to inject up to 50 units of insulin daily  -     generic lancets (ONETOUCH ULTRASOFT); Use to test blood sugars 4 times daily        The patient's current medical problems were reviewed.    I have had an Advance Directives " discussion with the patient.  The following health maintenance schedule was reviewed with the patient and provided in printed form in the after visit summary:   Health Maintenance   Topic Date Due     DIABETES HEMOGLOBIN A1C  05/02/2018     DIABETES FOLLOW-UP  05/02/2018     DIABETES URINE MICROALBUMIN  05/04/2018     INFLUENZA VACCINE RULE BASED (1) 08/01/2018     DIABETES FOOT EXAM  11/02/2018     FALL RISK ASSESSMENT  11/02/2018     DIABETES OPHTHALMOLOGY EXAM  11/07/2018     DXA SCAN  09/25/2020     ADVANCE DIRECTIVES DISCUSSED WITH PATIENT  11/02/2022     TD 18+ HE  08/28/2024     PNEUMOCOCCAL POLYSACCHARIDE VACCINE AGE 65 AND OVER  Completed     PNEUMOCOCCAL CONJUGATE VACCINE FOR ADULTS (PCV13 OR PREVNAR)  Completed     ZOSTER VACCINE  Completed        Subjective:   Chief Complaint: Kenyatta Og is an 84 y.o. female here for an Annual Wellness visit.   HPI: She is also here to follow-up on type 2 diabetes.  Has been doing well since last physical in November 2017.  Continues to follow with osteoporosis care.  Has Prolia injection scheduled for next week.  Continues on calcium and vitamin D supplement.  Continues to monitor blood sugars 3 times daily.  These readings are reviewed.  Blood sugars remain under very good control.  Readings generally range from the 80s-140s.  No significant hypoglycemia.  Continues on Lantus sliding scale at bedtime.  Has noticed with change to generic Lantus that she needs a little bit more insulin.  However typically does not take more than 12-15 units of Lantus per day.  Also uses Humalog sliding scale with meals.  Counts her carbs and adjust insulin based on glucose reading and carb counts.  Continues to feel very comfortable with managing her diabetes.  History of mild heart attack many years ago.  Remains on atorvastatin and aspirin.  Blood pressure is well controlled with lisinopril-hydrochlorothiazide.  Denies any new concerns with chest pain or pressure.  No dyspnea.  No  lower extremity edema.  No dizziness or lightheadedness.  No paresthesias in her feet.  Energy is good.  Overall feels well.  Looking forward to going to [place] for the winter.  Will leave in January.  Reviewed medications, allergies, family and social history.  Review of systems is assessed and is otherwise negative.  No other concerns or questions today.    Review of Systems:    Please see above.  The rest of the review of systems are negative for all systems.    Patient Care Team:  Jaz Lazar MD as PCP - General  Noel Enrique MD as Physician (Orthopedic Surgery)  Kashif Darden MD as Physician (Internal Medicine)     Patient Active Problem List   Diagnosis     Hyperlipidemia     Hypertension     Contact Dermatitis     Type 2 diabetes mellitus treated with insulin (H)     History of acute myocardial infarction     Osteoporosis Senile     History reviewed. No pertinent past medical history.   Past Surgical History:   Procedure Laterality Date     LA REMOVAL GALLBLADDER      Description: Cholecystectomy;  Recorded: 02/29/2008;      History reviewed. No pertinent family history.   Social History     Social History     Marital status:      Spouse name: N/A     Number of children: N/A     Years of education: N/A     Occupational History     Not on file.     Social History Main Topics     Smoking status: Never Smoker     Smokeless tobacco: Never Used     Alcohol use No     Drug use: No     Sexual activity: No     Other Topics Concern     Not on file     Social History Narrative      Current Outpatient Prescriptions   Medication Sig Dispense Refill     amoxicillin (AMOXIL) 500 MG capsule TAKE 4 CAPSULES BY MOUTH 1 HOUR PRIOR TO DENTAL PROCEDURE. 4 capsule 3     aspirin 81 MG EC tablet Take 81 mg by mouth daily.       atorvastatin (LIPITOR) 40 MG tablet TAKE 1 TABLET BY MOUTH EVERY DAY 90 tablet 3     blood glucose test (ONETOUCH ULTRA TEST) strips USE TO TEST FOUR TIMES DAILY AS DIRECTED 400 strip 3  "    cholecalciferol, vitamin D3, 1,000 unit tablet Take 1,000 Units by mouth daily.       denosumab (PROLIA) 60 mg/mL Syrg Inject 60 mg under the skin every 6 (six) months.       insulin glargine (LANTUS; BASAGLAR) 100 unit/mL (3 mL) pen Inject up to 20 units SC at bedtime 5 adj dose pen 3     insulin lispro (HUMALOG KWIKPEN INSULIN) 100 unit/mL pen INJECT UP TO 50 UNITS DAILY 45 mL 3     lisinopril-hydrochlorothiazide (PRINZIDE,ZESTORETIC) 20-12.5 mg per tablet TAKE 1 TABLET BY MOUTH EVERY DAY 90 tablet 3     calcium carbonate 1250 MG capsule Take 1,250 mg by mouth 2 (two) times a day with meals.       generic lancets (ONETOUCH ULTRASOFT) Use to test blood sugars 4 times daily 100 each 5     pen needle, diabetic 31 gauge x 5/16\" Ndle Use to inject up to 50 units of insulin daily 100 each 4     Current Facility-Administered Medications   Medication Dose Route Frequency Provider Last Rate Last Dose     denosumab 60 mg (PROLIA 60 mg/ml)  60 mg Subcutaneous Q6 Months Kashif Darden MD   60 mg at 05/10/18 1333      Objective:   Vital Signs:   Visit Vitals     /76 (Patient Site: Left Arm, Patient Position: Sitting, Cuff Size: Adult Regular)     Pulse 94     Temp 98.4  F (36.9  C) (Oral)     Ht 5' 6.75\" (1.695 m)     Wt 126 lb 1 oz (57.2 kg)     SpO2 97%     BMI 19.89 kg/m2        VisionScreening:  No exam data present     PHYSICAL EXAM  Physical Examination: General appearance - alert, well appearing, and in no distress  Mental status - alert, oriented to person, place, and time  Eyes - pupils equal and reactive, extraocular eye movements intact  Ears - bilateral TM's and external ear canals normal  Nose - normal and patent, no erythema, discharge or polyps  Mouth - mucous membranes moist, pharynx normal without lesions  Neck - supple, no significant adenopathy  Lymphatics - no palpable lymphadenopathy, no hepatosplenomegaly  Chest - clear to auscultation, no wheezes, rales or rhonchi, symmetric air entry  Heart - " normal rate, regular rhythm, normal S1, S2, no murmurs, rubs, clicks or gallops  Abdomen - soft, nontender, nondistended, no masses or organomegaly  Breasts - breasts appear normal, no suspicious masses, no skin or nipple changes or axillary nodes  Musculoskeletal - no joint tenderness, deformity or swelling  Extremities - peripheral pulses normal, no pedal edema, no clubbing or cyanosis, monofilament examination reveals slightly diminished sensation over the tops and bottoms of toes.  There is callus formation present tip of right great toe.  Hammertoes present second and third toes right foot with some mild callus formation and irritation on dorsum of those toes.  Skin - normal coloration and turgor, no rashes, no suspicious skin lesions noted      Assessment Results 11/8/2018   Activities of Daily Living No help needed   Instrumental Activities of Daily Living No help needed   Mini Cog Total Score 5   Some recent data might be hidden     A Mini-Cog score of 0-2 suggests the possibility of dementia, score of 3-5 suggests no dementia    Identified Health Risks:     Patient's advanced directive was discussed and I am comfortable with the patient's wishes.

## 2021-06-21 NOTE — LETTER
Letter by Jaz Lazar MD at      Author: Jaz Lazar MD Service: -- Author Type: --    Filed:  Encounter Date: 12/15/2020 Status: (Other)         Kenyatta Og  [address] [place] [id number]             December 15, 2020         Dear Ms. Lenka,    Below are the results from your recent visit:    Resulted Orders   Glycosylated Hemoglobin A1c   Result Value Ref Range    Hemoglobin A1c 6.1 (H) <=5.6 %   Comprehensive Metabolic Panel   Result Value Ref Range    Sodium 143 136 - 145 mmol/L    Potassium 4.7 3.5 - 5.0 mmol/L    Chloride 107 98 - 107 mmol/L    CO2 23 22 - 31 mmol/L    Anion Gap, Calculation 13 5 - 18 mmol/L    Glucose 132 (H) 70 - 125 mg/dL    BUN 29 (H) 8 - 28 mg/dL    Creatinine 1.09 0.60 - 1.10 mg/dL    GFR MDRD Af Amer 58 (L) >60 mL/min/1.73m2    GFR MDRD Non Af Amer 48 (L) >60 mL/min/1.73m2    Bilirubin, Total 0.7 0.0 - 1.0 mg/dL    Calcium 9.9 8.5 - 10.5 mg/dL    Protein, Total 7.0 6.0 - 8.0 g/dL    Albumin 4.1 3.5 - 5.0 g/dL    Alkaline Phosphatase 73 45 - 120 U/L    AST 31 0 - 40 U/L    ALT 21 0 - 45 U/L    Narrative    Fasting Glucose reference range is 70-99 mg/dL per  American Diabetes Association (ADA) guidelines.   Hemoglobin   Result Value Ref Range    Hemoglobin 13.3 12.0 - 16.0 g/dL   Vitamin D, Total (25-Hydroxy)   Result Value Ref Range    Vitamin D, Total (25-Hydroxy) 41.6 30.0 - 80.0 ng/mL    Narrative    Deficiency <10.0 ng/mL  Insufficiency 10.0-29.9 ng/mL  Sufficiency 30.0-80.0 ng/mL  Toxicity (possible) >100.0 ng/mL   Microalbumin, Random Urine   Result Value Ref Range    Microalbumin, Random Urine 1.56 0.00 - 1.99 mg/dL    Creatinine, Urine 82.5 mg/dL    Microalbumin/Creatinine Ratio Random Urine 18.9 <=19.9 mg/g    Narrative    Microalbumin, Random Urine  <2.0 mg/dL . . . . . . . . Normal  3.0-30.0 mg/dL . . . . . . Microalbuminuria  >30.0 mg/dL . . . . . .  . Clinical Proteinuria    Microalbumin/Creatinine Ratio, Random Urine  <20 mg/g . . . . .. . . . Normal    mg/g . . . . . . . Microalbuminuria  >300 mg/g . . . . . . . . Clinical Proteinuria       Lipid Cascade FASTING   Result Value Ref Range    Cholesterol 169 <=199 mg/dL    Triglycerides 97 <=149 mg/dL    HDL Cholesterol 80 >=50 mg/dL    LDL Calculated 70 <=129 mg/dL    Patient Fasting > 8hrs? Yes        Your lab results all look very good.  Your diabetes is under good control.    Please call with questions or contact us using [employer].    Sincerely,        Electronically signed by Jaz Lazar MD

## 2021-07-02 ENCOUNTER — AMBULATORY - HEALTHEAST (OUTPATIENT)
Dept: INTERNAL MEDICINE | Facility: CLINIC | Age: 86
End: 2021-07-02

## 2021-07-02 DIAGNOSIS — M81.0 SENILE OSTEOPOROSIS: ICD-10-CM

## 2021-07-02 DIAGNOSIS — Z92.29 PERSONAL HISTORY OF OTHER DRUG THERAPY: ICD-10-CM

## 2021-07-13 ENCOUNTER — TRANSCRIBE ORDERS (OUTPATIENT)
Dept: INTERNAL MEDICINE | Facility: CLINIC | Age: 86
End: 2021-07-13

## 2021-07-13 ENCOUNTER — RECORDS - HEALTHEAST (OUTPATIENT)
Dept: ADMINISTRATIVE | Facility: CLINIC | Age: 86
End: 2021-07-13

## 2021-07-13 DIAGNOSIS — Z92.29 PERSONAL HISTORY OF OTHER DRUG THERAPY: ICD-10-CM

## 2021-07-13 DIAGNOSIS — M81.0 SENILE OSTEOPOROSIS: Primary | ICD-10-CM

## 2021-07-13 NOTE — PROGRESS NOTES
As part of the required manual data conversion process for integration, this encounter was created to document a CAM (Clinic Administered Medication) order. This information was copied from the [facility] patient's chart to the [facility] patient chart.     Lelo Christopher, Dimple  July 13, 2021

## 2021-07-14 ENCOUNTER — OFFICE VISIT (OUTPATIENT)
Dept: INTERNAL MEDICINE | Facility: CLINIC | Age: 86
End: 2021-07-14
Payer: COMMERCIAL

## 2021-07-14 VITALS
SYSTOLIC BLOOD PRESSURE: 138 MMHG | HEART RATE: 100 BPM | WEIGHT: 125.9 LBS | DIASTOLIC BLOOD PRESSURE: 86 MMHG | BODY MASS INDEX: 20.02 KG/M2

## 2021-07-14 DIAGNOSIS — M81.0 SENILE OSTEOPOROSIS: Primary | ICD-10-CM

## 2021-07-14 DIAGNOSIS — N18.30 STAGE 3 CHRONIC KIDNEY DISEASE, UNSPECIFIED WHETHER STAGE 3A OR 3B CKD (H): ICD-10-CM

## 2021-07-14 PROCEDURE — 99214 OFFICE O/P EST MOD 30 MIN: CPT | Mod: 25 | Performed by: INTERNAL MEDICINE

## 2021-07-14 PROCEDURE — 96372 THER/PROPH/DIAG INJ SC/IM: CPT | Performed by: PHARMACIST

## 2021-07-14 NOTE — PATIENT INSTRUCTIONS
Prolia 15th today.  Prolia 16th in 6 months with my nurse. I will see you in 1 year.    DXA in 1/2023 .   Phone number to schedule [phone].    Daily calcium need is 9226-5342 mg a day from the diet and supplements.  Calcium citrate is easier to digest.  Vitamin D 2000 IU daily recommended.

## 2021-07-14 NOTE — PROGRESS NOTES
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 Lab results from December 2020 reviewed.  Creatinine 0.60 - 1.10 mg/dL 1.09      GFR Estimate If Black >60 mL/min/1.73m2 58Low      GFR Estimate >60 mL/min/1.73m2 48Low          Patient was educated on safety of Prolia utilizing Patient Counseling Chart for Healthcare Providers, as outlined by the Prolia REMS progam.     Return in about 6 months (around 1/14/2022) for Prolia with CSS.    Patient Instructions   Prolia 15th today.  Prolia 16th in 6 months with my nurse. I will see you in 1 year.    DXA in 1/2023 .   Phone number to schedule [phone].    Daily calcium need is 4863-0301 mg a day from the diet and supplements.  Calcium citrate is easier to digest.  Vitamin D 2000 IU daily recommended.          /86   Pulse 100   Wt 57.1 kg (125 lb 14.4 oz)   Breastfeeding No   BMI 20.02 kg/m        Did you experience any problems with previous Prolia injection? no  Any medication change in the last 6 months? no  Did you take prednisone or other immunosupressant drugs in the last 6 months   (chemo, transplant, rheum, dermatology conditions)? no  Did you have any serious infection in the last 6 months?no  Any recent hospitalizations?no  Do you plan any dental work in the next 2-3 months?no  How much calcium do you take daily from the diet and supplements?1200 mg  How much vit D do you take daily? 2000 IU  Last DXA? 1/2021, Reviewed and discussed      Patient is here today for the 15th Prolia injection. Patient tolerated previous injections well.   We discussed calcium and vit D daily needs today.           Next Prolia injection will be in 6 months.           This note has been dictated using voice recognition software. Any grammatical or context distortions are unintentional and inherent to the software      Patient Active Problem List   Diagnosis      "Hyperlipidemia     Hypertension     Contact Dermatitis     Type 2 diabetes mellitus treated with insulin (H)     History of acute myocardial infarction     Osteoporosis Senile     Chronic kidney disease, stage 3       Current Outpatient Medications   Medication     amoxicillin (AMOXIL) 500 MG capsule     aspirin 81 MG EC tablet     atorvastatin (LIPITOR) 40 MG tablet     blood glucose meter (GLUCOMETER)     blood glucose test (ACCU-CHEK GUIDE TEST STRIPS) strips     calcium carbonate 1250 MG capsule     cholecalciferol, vitamin D3, 1,000 unit tablet     denosumab (PROLIA) 60 MG/ML SOSY injection     generic lancets (ACCU-CHEK SOFTCLIX LANCETS)     insulin glargine (LANTUS SOLOSTAR PEN) 100 unit/mL (3 mL) pen     insulin lispro (HUMALOG KWIKPEN INSULIN) 100 unit/mL pen     lisinopriL-hydrochlorothiazide (PRINZIDE,ZESTORETIC) 20-12.5 mg per tablet     pen needle, diabetic (BD ULTRA-FINE SHORT PEN NEEDLE) 31 gauge x 5/16\" Ndle     triamcinolone (KENALOG) 0.1 % cream     Current Facility-Administered Medications   Medication     denosumab (PROLIA) injection 60 mg     "

## 2021-07-21 ENCOUNTER — RECORDS - HEALTHEAST (OUTPATIENT)
Dept: ADMINISTRATIVE | Facility: CLINIC | Age: 86
End: 2021-07-21

## 2021-08-15 ENCOUNTER — HEALTH MAINTENANCE LETTER (OUTPATIENT)
Age: 86
End: 2021-08-15

## 2021-10-11 ENCOUNTER — HEALTH MAINTENANCE LETTER (OUTPATIENT)
Age: 86
End: 2021-10-11

## 2021-10-27 ENCOUNTER — IMMUNIZATION (OUTPATIENT)
Dept: NURSING | Facility: CLINIC | Age: 86
End: 2021-10-27
Payer: COMMERCIAL

## 2021-10-27 PROCEDURE — 91300 PR COVID VAC PFIZER DIL RECON 30 MCG/0.3 ML IM: CPT

## 2021-10-27 PROCEDURE — 0004A PR COVID VAC PFIZER DIL RECON 30 MCG/0.3 ML IM: CPT

## 2021-11-01 DIAGNOSIS — E78.2 MIXED HYPERLIPIDEMIA: ICD-10-CM

## 2021-11-01 DIAGNOSIS — I10 ESSENTIAL HYPERTENSION: ICD-10-CM

## 2021-11-02 RX ORDER — ATORVASTATIN CALCIUM 40 MG/1
TABLET, FILM COATED ORAL
Qty: 90 TABLET | Refills: 3 | Status: SHIPPED | OUTPATIENT
Start: 2021-11-02 | End: 2022-02-03

## 2021-11-02 RX ORDER — LISINOPRIL AND HYDROCHLOROTHIAZIDE 12.5; 2 MG/1; MG/1
1 TABLET ORAL DAILY
Qty: 90 TABLET | Refills: 3 | Status: SHIPPED | OUTPATIENT
Start: 2021-11-02 | End: 2022-02-03

## 2022-01-25 ENCOUNTER — ALLIED HEALTH/NURSE VISIT (OUTPATIENT)
Dept: FAMILY MEDICINE | Facility: CLINIC | Age: 87
End: 2022-01-25
Payer: COMMERCIAL

## 2022-01-25 DIAGNOSIS — M81.0 SENILE OSTEOPOROSIS: Primary | ICD-10-CM

## 2022-01-25 PROCEDURE — 99207 PR NO CHARGE NURSE ONLY: CPT

## 2022-01-25 PROCEDURE — 96372 THER/PROPH/DIAG INJ SC/IM: CPT | Performed by: PHARMACIST

## 2022-01-25 NOTE — PROGRESS NOTES
Indication: Prolia  (denosumab) is a prescription medicine used to treat osteoporosis in patients who:     Are at high risk for fracture, meaning patients who have had a fracture related to osteoporosis, or who have multiple risk factors for fracture     Cannot use another osteoporosis medicine or other osteoporosis medicines did not work well   The timeline for early/late injections would be 4 weeks early and any time after the 6 month yamel. If a patient receives their injection late, then the subsequent injection would be 6 months from the date that they actually received the injection    1.  When was the last injection?  7/14/21  2.  Did they check with their insurance for this calendar year?  yes  3.  Is there an order in the chart?  yes  4.  Has the patient had dental work involving the bone in the past month or will have work in the next 6 months?  no  5.  Did you have the patient wait 15 minutes after the injection?  yes  6.  Remember to use .injection under the medication notes    The following steps were completed to comply with the REMS program for Prolia:    Reviewed information in the Medication Guide and Patient Counseling Chart, including the serious risks of Prolia  and the symptoms of each risk.    Advised patient to seek prompt medical attention if they have signs or symptoms of any of the serious risks.  Provided each patient a copy of the Medication Guide and Patient Brochure.  The following steps were completed to comply with the REMS program for Prolia:  1. Ordering provider has previously reviewed information in the Medication Guide and Patient Counseling Chart, including the serious risks of Prolia  and the symptoms of each risk and have been advised to seek prompt medical attention if they have signs or symptoms of any of the serious risks.  2. Provided each patient a copy of the Medication Guide and Patient Brochure.  See MAR for administration details.   Indication: Prolia  (denosumab) is a  prescription medicine used to treat osteoporosis in patients who:   Are at high risk for fracture, meaning patients who have had a fracture related to osteoporosis, or who have multiple risk factors for fracture; Cannot use another osteoporosis medicine or other osteoporosis medicines did not work well.   The timeline for early/late injections would be 4 weeks early and any time after the 6 month yamel. If a patient receives their injection late, then the subsequent injection would be 6 months from the date that they actually received the injection    Have the screening questions been asked prior to this administration? Yes   Clinic Administered Medication Documentation          Prolia Documentation    Prior to injection, verified patient identity using patient's name and date of birth. Medication was administered. Please see MAR and medication order for additional information. Patient instructed to remain in clinic for 15 minutes.    Indication: Prolia  (denosumab) is a prescription medicine used to treat osteoporosis in patients who:     Are at high risk for fracture, meaning patients who have had a fracture related to osteoporosis, or who have multiple risk factors for fracture.    Cannot use another osteoporosis medicine or other osteoporosis medicines did not work well.    The timeline for early/late injections would be 4 weeks early and any time after the 6 month yamel. If a patient receives their injection late, then the subsequent injection would be 6 months from the date that they actually received the injection.    When was the last injection?  7/14/21  Was the last injection at least 6 months ago? Yes  Has the prior authorization been completed?  Yes  Is there an active order (within the past 365 days) in the chart?  Yes  Patient denied having dental work involving the bone in the past 6 months?  Yes  Patient denies a plan to dental work involving the bone in the next 6 months? Yes    The following steps  were completed to comply with the REMS program for Prolia:    Confirms that patient received education from RN or provider via the Medication Guide and Patient Counseling Chart, including the serious risks of Prolia  and the symptoms of each risk.    Told the patient to seek prompt medical attention if they have signs or symptoms of any of the serious risks, as described in the Medication Guide and Patient Counseling Chart that was reviewed between the patient and RN or LP.    Provided each patient a copy of the Medication Guide and Patient Brochure.      Was entire vial of medication used? Yes  Vial/Syringe: Syringe  Expiration Date:  04/2024  Was the medication not being billed by clinic? Yes, Medication was received directly from a [place] pharmacy in a staff to staff handoff or via  delivery and the patient HAS NOT been billed for the medication (follow site specific policies)

## 2022-01-30 ENCOUNTER — HEALTH MAINTENANCE LETTER (OUTPATIENT)
Age: 87
End: 2022-01-30

## 2022-02-03 ENCOUNTER — OFFICE VISIT (OUTPATIENT)
Dept: FAMILY MEDICINE | Facility: CLINIC | Age: 87
End: 2022-02-03
Payer: COMMERCIAL

## 2022-02-03 VITALS
HEIGHT: 66 IN | BODY MASS INDEX: 20.01 KG/M2 | SYSTOLIC BLOOD PRESSURE: 112 MMHG | WEIGHT: 124.5 LBS | HEART RATE: 97 BPM | OXYGEN SATURATION: 100 % | DIASTOLIC BLOOD PRESSURE: 62 MMHG

## 2022-02-03 DIAGNOSIS — E78.2 MIXED HYPERLIPIDEMIA: ICD-10-CM

## 2022-02-03 DIAGNOSIS — Z79.4 TYPE 2 DIABETES MELLITUS WITHOUT COMPLICATION, WITH LONG-TERM CURRENT USE OF INSULIN (H): ICD-10-CM

## 2022-02-03 DIAGNOSIS — E11.9 TYPE 2 DIABETES MELLITUS WITHOUT COMPLICATION, WITH LONG-TERM CURRENT USE OF INSULIN (H): ICD-10-CM

## 2022-02-03 DIAGNOSIS — I10 ESSENTIAL HYPERTENSION: ICD-10-CM

## 2022-02-03 DIAGNOSIS — Z00.00 ENCOUNTER FOR MEDICARE ANNUAL WELLNESS EXAM: Primary | ICD-10-CM

## 2022-02-03 DIAGNOSIS — R01.1 HEART MURMUR: ICD-10-CM

## 2022-02-03 DIAGNOSIS — N18.30 STAGE 3 CHRONIC KIDNEY DISEASE, UNSPECIFIED WHETHER STAGE 3A OR 3B CKD (H): ICD-10-CM

## 2022-02-03 LAB
ALBUMIN SERPL-MCNC: 4 G/DL (ref 3.5–5)
ALP SERPL-CCNC: 68 U/L (ref 45–120)
ALT SERPL W P-5'-P-CCNC: 19 U/L (ref 0–45)
ANION GAP SERPL CALCULATED.3IONS-SCNC: 12 MMOL/L (ref 5–18)
AST SERPL W P-5'-P-CCNC: 28 U/L (ref 0–40)
BILIRUB SERPL-MCNC: 0.6 MG/DL (ref 0–1)
BUN SERPL-MCNC: 34 MG/DL (ref 8–28)
CALCIUM SERPL-MCNC: 10.2 MG/DL (ref 8.5–10.5)
CHLORIDE BLD-SCNC: 108 MMOL/L (ref 98–107)
CHOLEST SERPL-MCNC: 156 MG/DL
CO2 SERPL-SCNC: 22 MMOL/L (ref 22–31)
CREAT SERPL-MCNC: 1.14 MG/DL (ref 0.6–1.1)
ERYTHROCYTE [DISTWIDTH] IN BLOOD BY AUTOMATED COUNT: 14.6 % (ref 10–15)
FASTING STATUS PATIENT QL REPORTED: YES
GFR SERPL CREATININE-BSD FRML MDRD: 46 ML/MIN/1.73M2
GLUCOSE BLD-MCNC: 68 MG/DL (ref 70–125)
HBA1C MFR BLD: 5.6 % (ref 0–5.6)
HCT VFR BLD AUTO: 41.5 % (ref 35–47)
HDLC SERPL-MCNC: 68 MG/DL
HGB BLD-MCNC: 13.4 G/DL (ref 11.7–15.7)
LDLC SERPL CALC-MCNC: 73 MG/DL
MCH RBC QN AUTO: 31.6 PG (ref 26.5–33)
MCHC RBC AUTO-ENTMCNC: 32.3 G/DL (ref 31.5–36.5)
MCV RBC AUTO: 98 FL (ref 78–100)
PLATELET # BLD AUTO: 242 10E3/UL (ref 150–450)
POTASSIUM BLD-SCNC: 4.3 MMOL/L (ref 3.5–5)
PROT SERPL-MCNC: 6.9 G/DL (ref 6–8)
RBC # BLD AUTO: 4.24 10E6/UL (ref 3.8–5.2)
SODIUM SERPL-SCNC: 142 MMOL/L (ref 136–145)
TRIGL SERPL-MCNC: 73 MG/DL
WBC # BLD AUTO: 8.1 10E3/UL (ref 4–11)

## 2022-02-03 PROCEDURE — 80053 COMPREHEN METABOLIC PANEL: CPT | Performed by: FAMILY MEDICINE

## 2022-02-03 PROCEDURE — 80061 LIPID PANEL: CPT | Performed by: FAMILY MEDICINE

## 2022-02-03 PROCEDURE — 83036 HEMOGLOBIN GLYCOSYLATED A1C: CPT | Performed by: FAMILY MEDICINE

## 2022-02-03 PROCEDURE — 99397 PER PM REEVAL EST PAT 65+ YR: CPT | Performed by: FAMILY MEDICINE

## 2022-02-03 PROCEDURE — 36415 COLL VENOUS BLD VENIPUNCTURE: CPT | Performed by: FAMILY MEDICINE

## 2022-02-03 PROCEDURE — 85027 COMPLETE CBC AUTOMATED: CPT | Performed by: FAMILY MEDICINE

## 2022-02-03 RX ORDER — INSULIN LISPRO 200 [IU]/ML
INJECTION, SOLUTION SUBCUTANEOUS
Qty: 45 ML | Refills: 3 | Status: SHIPPED | OUTPATIENT
Start: 2022-02-03 | End: 2023-02-20

## 2022-02-03 RX ORDER — ATORVASTATIN CALCIUM 40 MG/1
TABLET, FILM COATED ORAL
Qty: 90 TABLET | Refills: 3 | Status: SHIPPED | OUTPATIENT
Start: 2022-02-03 | End: 2023-02-20

## 2022-02-03 RX ORDER — LISINOPRIL AND HYDROCHLOROTHIAZIDE 12.5; 2 MG/1; MG/1
1 TABLET ORAL DAILY
Qty: 90 TABLET | Refills: 3 | Status: SHIPPED | OUTPATIENT
Start: 2022-02-03 | End: 2023-02-20

## 2022-02-03 RX ORDER — BLOOD SUGAR DIAGNOSTIC
STRIP MISCELLANEOUS
Qty: 400 STRIP | Refills: 3 | Status: SHIPPED | OUTPATIENT
Start: 2022-02-03 | End: 2022-05-04

## 2022-02-03 ASSESSMENT — ACTIVITIES OF DAILY LIVING (ADL): CURRENT_FUNCTION: NO ASSISTANCE NEEDED

## 2022-02-03 ASSESSMENT — MIFFLIN-ST. JEOR: SCORE: 1020.45

## 2022-02-03 NOTE — PATIENT INSTRUCTIONS
Patient Education   Personalized Prevention Plan  You are due for the preventive services outlined below.  Your care team is available to assist you in scheduling these services.  If you have already completed any of these items, please share that information with your care team to update in your medical record.  Health Maintenance Due   Topic Date Due     ANNUAL REVIEW OF HM ORDERS  Never done     Urine Test  Never done     A1C Lab  06/09/2021     Flu Vaccine (1) 09/01/2021     Basic Metabolic Panel  12/09/2021     Cholesterol Lab  12/09/2021     Kidney Microalbumin Urine Test  12/09/2021     FALL RISK ASSESSMENT  12/09/2021     Hemoglobin  12/09/2021     Eye Exam  02/15/2022       Exercise for a Healthier Heart  You may wonder how you can improve the health of your heart. If you re thinking about exercise, you re on the right track. You don t need to become an athlete. But you do need a certain amount of brisk exercise to help strengthen your heart. If you have been diagnosed with a heart condition, your healthcare provider may advise exercise to help stabilize your condition. To help make exercise a habit, choose safe, fun activities.      Exercise with a friend. When activity is fun, you're more likely to stick with it.   Before you start  Check with your healthcare provider before starting an exercise program. This is especially important if you have not been active for a while. It's also important if you have a long-term (chronic) health problem such as heart disease, diabetes, or obesity. Or if you are at high risk for having these problems.   Why exercise?  Exercising regularly offers many healthy rewards. It can help you do all of the following:     Improve your blood cholesterol level to help prevent further heart trouble    Lower your blood pressure to help prevent a stroke or heart attack    Control diabetes, or reduce your risk of getting this disease    Improve your heart and lung function    Reach  and stay at a healthy weight    Make your muscles stronger so you can stay active    Prevent falls and fractures by slowing the loss of bone mass (osteoporosis)    Manage stress better    Reduce your blood pressure    Improve your sense of self and your body image  Exercise tips      Ease into your routine. Set small goals. Then build on them. If you are not sure what your activity level should be, talk with your healthcare provider first before starting an exercise routine.    Exercise on most days. Aim for a total of 150 minutes (2 hours and 30 minutes) or more of moderate-intensity aerobic activity each week. Or 75 minutes (1 hour and 15 minutes) or more of vigorous-intensity aerobic activity each week. Or try for a combination of both. Moderate activity means that you breathe heavier and your heart rate increases but you can still talk. Think about doing 40 minutes of moderate exercise, 3 to 4 times a week. For best results, activity should last for about 40 minutes to lower blood pressure and cholesterol. It's OK to work up to the 40-minute period over time. Examples of moderate-intensity activity are walking 1 mile in 15 minutes. Or doing 30 to 45 minutes of yard work.    Step up your daily activity level.  Along with your exercise program, try being more active the whole day. Walk instead of drive. Or park further away so that you take more steps each day. Do more household tasks or yard work. You may not be able to meet the advised mount of physical activity. But doing some moderate- or vigorous-intensity aerobic activity can help reduce your risk for heart disease. Your healthcare provider can help you figure out what is best for you.    Choose 1 or more activities you enjoy.  Walking is one of the easiest things you can do. You can also try swimming, riding a bike, dancing, or taking an exercise class.    When to call your healthcare provider  Call your healthcare provider if you have any of these:      Chest pain or feel dizzy or lightheaded    Burning, tightness, pressure, or heaviness in your chest, neck, shoulders, back, or arms    Abnormal shortness of breath    More joint or muscle pain    A very fast or irregular heartbeat (palpitations)  Timothy last reviewed this educational content on 7/1/2019 2000-2021 [employer]. All rights reserved. This information is not intended as a substitute for professional medical care. Always follow your healthcare professional's instructions.          Understanding USDA MyPlate  The USDA has guidelines to help you make healthy food choices. These are called MyPlate. MyPlate shows the food groups that make up healthy meals using the image of a place setting. Before you eat, think about the healthiest choices for what to put on your plate or in your cup or bowl. To learn more about building a healthy plate, visit www.choosemyplate.gov.    The food groups    Fruits. Any fruit or 100% fruit juice counts as part of the Fruit Group. Fruits may be fresh, canned, frozen, or dried, and may be whole, cut-up, or pureed. Make 1/2 of your plate fruits and vegetables.    Vegetables. Any vegetable or 100% vegetable juice counts as a member of the Vegetable Group. Vegetables may be fresh, frozen, canned, or dried. They can be served raw or cooked and may be whole, cut-up, or mashed. Make 1/2 of your plate fruits and vegetables.    Grains. All foods made from grains are part of the Grains Group. These include wheat, rice, oats, cornmeal, and barley. Grains are often used to make foods such as bread, pasta, oatmeal, cereal, tortillas, and grits. Grains should be no more than 1/4 of your plate. At least half of your grains should be whole grains.    Protein. This group includes meat, poultry, seafood, beans and peas, eggs, processed soy products (such as tofu), nuts (including nut butters), and seeds. Make protein choices no more than 1/4 of your plate. Meat and poultry  choices should be lean or low fat.    Dairy. The Dairy Group includes all fluid milk products and foods made from milk that contain calcium, such as yogurt and cheese. (Foods that have little calcium, such as cream, butter, and cream cheese, are not part of this group.) Most dairy choices should be low-fat or fat-free.    Oils. Oils aren't a food group, but they do contain essential nutrients. However it's important to watch your intake of oils. These are fats that are liquid at room temperature. They include canola, corn, olive, soybean, vegetable, and sunflower oil. Foods that are mainly oil include mayonnaise, certain salad dressings, and soft margarines. You likely already get your daily oil allowance from the foods you eat.  Things to limit  Eating healthy also means limiting these things in your diet:       Salt (sodium). Many processed foods have a lot of sodium. To keep sodium intake down, eat fresh vegetables, meats, poultry, and seafood when possible. Purchase low-sodium, reduced-sodium, or no-salt-added food products at the store. And don't add salt to your meals at home. Instead, season them with herbs and spices such as dill, oregano, cumin, and paprika. Or try adding flavor with lemon or lime zest and juice.    Saturated fat. Saturated fats are most often found in animal products such as beef, pork, and chicken. They are often solid at room temperature, such as butter. To reduce your saturated fat intake, choose leaner cuts of meat and poultry. And try healthier cooking methods such as grilling, broiling, roasting, or baking. For a simple lower-fat swap, use plain nonfat yogurt instead of mayonnaise when making potato salad or macaroni salad.    Added sugars. These are sugars added to foods. They are in foods such as ice cream, candy, soda, fruit drinks, sports drinks, energy drinks, cookies, pastries, jams, and syrups. Cut down on added sugars by sharing sweet treats with a family member or friend.  You can also choose fruit for dessert, and drink water or other unsweetened beverages.     [employer] last reviewed this educational content on 6/1/2020 2000-2021 [employer]. All rights reserved. This information is not intended as a substitute for professional medical care. Always follow your healthcare professional's instructions.

## 2022-02-03 NOTE — LETTER
February 8, 2022      Kenyatta Og  [address] [place] [address] [address]  [facility] [address]        Dear ,    We are writing to inform you of your test results.    Your diabetes is under very good control. Your cholesterol levels and blood counts are normal. Your liver enzymes are normal. Your kidney function remains slightly reduced but is stable. We will continue to monitor this. Please continue your current medications. Thank you for coming in!    Resulted Orders   Comprehensive metabolic panel (BMP + Alb, Alk Phos, ALT, AST, Total. Bili, TP)   Result Value Ref Range    Sodium 142 136 - 145 mmol/L    Potassium 4.3 3.5 - 5.0 mmol/L    Chloride 108 (H) 98 - 107 mmol/L    Carbon Dioxide (CO2) 22 22 - 31 mmol/L    Anion Gap 12 5 - 18 mmol/L    Urea Nitrogen 34 (H) 8 - 28 mg/dL    Creatinine 1.14 (H) 0.60 - 1.10 mg/dL    Calcium 10.2 8.5 - 10.5 mg/dL    Glucose 68 (L) 70 - 125 mg/dL    Alkaline Phosphatase 68 45 - 120 U/L    AST 28 0 - 40 U/L    ALT 19 0 - 45 U/L    Protein Total 6.9 6.0 - 8.0 g/dL    Albumin 4.0 3.5 - 5.0 g/dL    Bilirubin Total 0.6 0.0 - 1.0 mg/dL    GFR Estimate 46 (L) >60 mL/min/1.73m2      Comment:      Effective December 21, 2021 eGFRcr in adults is calculated using the 2021 CKD-EPI creatinine equation which includes age and gender (Klaus alatorre al., NEJ, DOI: 10.1056/GSJQyu3342609)   Lipid panel reflex to direct LDL Fasting   Result Value Ref Range    Cholesterol 156 <=199 mg/dL    Triglycerides 73 <=149 mg/dL    Direct Measure HDL 68 >=50 mg/dL      Comment:      HDL Cholesterol Reference Range:     0-2 years:   No reference ranges established for patients under 2 years old  at [facility] for lipid analytes.    2-8 years:  Greater than 45 mg/dL     18 years and older:   Female: Greater than or equal to 50 mg/dL   Male:   Greater than or equal to 40 mg/dL    LDL Cholesterol Calculated 73 <=129 mg/dL    Patient Fasting > 8hrs? Yes    CBC with platelets   Result Value Ref Range    WBC Count 8.1  4.0 - 11.0 10e3/uL    RBC Count 4.24 3.80 - 5.20 10e6/uL    Hemoglobin 13.4 11.7 - 15.7 g/dL    Hematocrit 41.5 35.0 - 47.0 %    MCV 98 78 - 100 fL    MCH 31.6 26.5 - 33.0 pg    MCHC 32.3 31.5 - 36.5 g/dL    RDW 14.6 10.0 - 15.0 %    Platelet Count 242 150 - 450 10e3/uL   Hemoglobin A1c   Result Value Ref Range    Hemoglobin A1C 5.6 0.0 - 5.6 %      Comment:      Normal <5.7%   Prediabetes 5.7-6.4%    Diabetes 6.5% or higher     Note: Adopted from ADA consensus guidelines.       If you have any questions or concerns, please call the clinic at the number listed above.       Sincerely,      Jaz Lazar MD

## 2022-02-22 ENCOUNTER — HOSPITAL ENCOUNTER (OUTPATIENT)
Dept: CARDIOLOGY | Facility: CLINIC | Age: 87
Discharge: HOME | End: 2022-02-22
Attending: FAMILY MEDICINE | Admitting: FAMILY MEDICINE
Payer: COMMERCIAL

## 2022-02-22 DIAGNOSIS — R01.1 HEART MURMUR: ICD-10-CM

## 2022-02-22 PROCEDURE — 93306 TTE W/DOPPLER COMPLETE: CPT | Mod: 26 | Performed by: INTERNAL MEDICINE

## 2022-02-22 PROCEDURE — 93306 TTE W/DOPPLER COMPLETE: CPT

## 2022-03-15 ENCOUNTER — TELEPHONE (OUTPATIENT)
Dept: FAMILY MEDICINE | Facility: CLINIC | Age: 87
End: 2022-03-15
Payer: COMMERCIAL

## 2022-03-15 DIAGNOSIS — Z79.2 NEED FOR PROPHYLACTIC ANTIBIOTIC: ICD-10-CM

## 2022-03-15 RX ORDER — AMOXICILLIN 500 MG/1
CAPSULE ORAL
Qty: 4 CAPSULE | Refills: 3 | Status: SHIPPED | OUTPATIENT
Start: 2022-03-15 | End: 2023-02-20

## 2022-03-15 NOTE — TELEPHONE ENCOUNTER
Patient requesting abx for a dental procedure that is to be done on March the 31st. Last office visit was on 02/03/2022 and last refill for abx was on 12/09/2020.

## 2022-03-15 NOTE — TELEPHONE ENCOUNTER
Ok per patient to wait for your return on 03/16 to address.  Patient had an echo done on 02/22 and she is looking for results. Patient can be reached at [phone] and it is ok to leave message on voicemail regarding results.

## 2022-03-15 NOTE — TELEPHONE ENCOUNTER
Please let patient know that her echocardiogram shows that her heart function is normal.  She does have moderate aortic stenosis.  This means that her aortic valve is not opening quite as widely as a normal aortic valve.  This is a condition that can develop as people get older.  At this point, I recommend that we monitor and get a follow-up echocardiogram in 1 year to see if the stenosis has progressed.  She should also let us know if she starts to experience symptoms of increased shortness of breath, fatigue, dizziness or lightheadedness.

## 2022-03-17 ENCOUNTER — TRANSFERRED RECORDS (OUTPATIENT)
Dept: HEALTH INFORMATION MANAGEMENT | Facility: CLINIC | Age: 87
End: 2022-03-17
Payer: COMMERCIAL

## 2022-03-17 LAB — RETINOPATHY: NORMAL

## 2022-04-21 ENCOUNTER — TELEPHONE (OUTPATIENT)
Dept: FAMILY MEDICINE | Facility: CLINIC | Age: 87
End: 2022-04-21
Payer: COMMERCIAL

## 2022-04-21 DIAGNOSIS — M25.551 HIP PAIN, RIGHT: Primary | ICD-10-CM

## 2022-04-21 NOTE — TELEPHONE ENCOUNTER
Patient requesting referral for ortho for rt leg/hip pain. Has been seen for in the past, call patient with questions

## 2022-04-21 NOTE — TELEPHONE ENCOUNTER
Referral placed.  Please give her contact information for [facility] to schedule an appointment

## 2022-04-22 ENCOUNTER — ALLIED HEALTH/NURSE VISIT (OUTPATIENT)
Dept: FAMILY MEDICINE | Facility: CLINIC | Age: 87
End: 2022-04-22
Payer: COMMERCIAL

## 2022-04-22 DIAGNOSIS — Z23 COVID-19 VACCINE ADMINISTERED: Primary | ICD-10-CM

## 2022-04-22 DIAGNOSIS — Z23 HIGH PRIORITY FOR 2019-NCOV VACCINE: Primary | ICD-10-CM

## 2022-04-22 PROCEDURE — 0054A COVID-19,PF,PFIZER (12+ YRS): CPT

## 2022-04-22 PROCEDURE — 91305 COVID-19,PF,PFIZER (12+ YRS): CPT

## 2022-04-22 PROCEDURE — 99207 PR NO CHARGE NURSE ONLY: CPT

## 2022-04-27 ENCOUNTER — TRANSFERRED RECORDS (OUTPATIENT)
Dept: HEALTH INFORMATION MANAGEMENT | Facility: CLINIC | Age: 87
End: 2022-04-27
Payer: COMMERCIAL

## 2022-05-03 DIAGNOSIS — E11.9 TYPE 2 DIABETES MELLITUS WITHOUT COMPLICATION, WITH LONG-TERM CURRENT USE OF INSULIN (H): ICD-10-CM

## 2022-05-03 DIAGNOSIS — Z79.4 TYPE 2 DIABETES MELLITUS WITHOUT COMPLICATION, WITH LONG-TERM CURRENT USE OF INSULIN (H): ICD-10-CM

## 2022-05-03 DIAGNOSIS — E11.9 TYPE 2 DIABETES MELLITUS TREATED WITH INSULIN (H): ICD-10-CM

## 2022-05-03 DIAGNOSIS — Z79.4 TYPE 2 DIABETES MELLITUS TREATED WITH INSULIN (H): ICD-10-CM

## 2022-05-03 NOTE — TELEPHONE ENCOUNTER
Requested refills for the following:     generic lancets (ACCU-CHEK SOFTCLIX LANCETS)    insulin glargine (LANTUS PEN) 100 UNIT/ML pen    blood glucose (ACCU-CHEK GUIDE) test strip

## 2022-05-04 RX ORDER — BLOOD SUGAR DIAGNOSTIC
STRIP MISCELLANEOUS
Qty: 400 STRIP | Refills: 3 | Status: SHIPPED | OUTPATIENT
Start: 2022-05-04 | End: 2023-02-06

## 2022-05-05 ENCOUNTER — TELEPHONE (OUTPATIENT)
Dept: FAMILY MEDICINE | Facility: CLINIC | Age: 87
End: 2022-05-05
Payer: COMMERCIAL

## 2022-05-05 NOTE — TELEPHONE ENCOUNTER
PA Initiation    Medication: blood glucose (ACCU-CHEK GUIDE) test strip  Insurance Company: [employer]*   Pharmacy Filling the Rx:[facility]    Filling Pharmacy Phone: [phone]  Filling Pharmacy Fax: [phone]  Start Date:05/04/2022

## 2022-05-10 NOTE — TELEPHONE ENCOUNTER
Central Prior Authorization Team   Phone: [phone]      Prior Authorization Not Needed per Insurance    Medication: blood glucose (ACCU-CHEK GUIDE) test strip--NOT NEEDED  Insurance Company: [employer] - Phone [phone] Fax [phone]  Expected CoPay:      Pharm[facility] the Rx: [id number] [place] [address] AT [place] [address]  Pharmacy Notified: Yes  Patient Notified: Yes ALREADY PICKED UP

## 2022-06-17 DIAGNOSIS — Z92.29 PERSONAL HISTORY OF OTHER DRUG THERAPY: ICD-10-CM

## 2022-06-17 DIAGNOSIS — M81.0 SENILE OSTEOPOROSIS: Primary | ICD-10-CM

## 2022-08-09 ENCOUNTER — OFFICE VISIT (OUTPATIENT)
Dept: INTERNAL MEDICINE | Facility: CLINIC | Age: 87
End: 2022-08-09
Payer: COMMERCIAL

## 2022-08-09 VITALS
DIASTOLIC BLOOD PRESSURE: 80 MMHG | HEART RATE: 71 BPM | OXYGEN SATURATION: 99 % | WEIGHT: 129 LBS | BODY MASS INDEX: 20.66 KG/M2 | SYSTOLIC BLOOD PRESSURE: 130 MMHG

## 2022-08-09 DIAGNOSIS — N18.30 STAGE 3 CHRONIC KIDNEY DISEASE, UNSPECIFIED WHETHER STAGE 3A OR 3B CKD (H): ICD-10-CM

## 2022-08-09 DIAGNOSIS — E11.9 TYPE 2 DIABETES MELLITUS TREATED WITH INSULIN (H): ICD-10-CM

## 2022-08-09 DIAGNOSIS — Z79.4 TYPE 2 DIABETES MELLITUS TREATED WITH INSULIN (H): ICD-10-CM

## 2022-08-09 DIAGNOSIS — M81.0 SENILE OSTEOPOROSIS: Primary | ICD-10-CM

## 2022-08-09 PROCEDURE — 96372 THER/PROPH/DIAG INJ SC/IM: CPT | Performed by: INTERNAL MEDICINE

## 2022-08-09 PROCEDURE — 99214 OFFICE O/P EST MOD 30 MIN: CPT | Mod: 25 | Performed by: INTERNAL MEDICINE

## 2022-08-09 NOTE — PATIENT INSTRUCTIONS
Prolia 17th today.  Prolia 18th in 6 months with my nurse. I will see you in 1 year.    DXA in 1/2023.   Phone number to schedule [phone].    Daily calcium need is 0820-0642 mg a day from the diet and supplements.  Calcium citrate is easier to digest.  Vitamin D 2000 IU daily recommended.    Risk of rebound vertebral fractures is higher when Prolia suddenly stopped or dose was missed.      Prolia and Covid vaccine should be  for at least a week.

## 2022-08-09 NOTE — PROGRESS NOTES
(M81.0) Osteoporosis Senile  (primary encounter diagnosis)  Comment: Due for Prolia #18, tolerating treatment well. Due for  DXA in Jan.  Plan: DX Hip/Pelvis/Spine            (E11.9,  Z79.4) Type 2 diabetes mellitus treated with insulin (H)  Comment: stable on insulin.  Regardless of normal or even high bone mineral density, patients with type 2 diabetes mellitus, have increased risk of fractures. One class of antidiabetic drugs, thiazolidinediones (pioglitazone, rosiglitazone), causes bone loss and further increases facture risk, placing TZDs in the category of drugs causing secondary osteoporosis.     (N18.30) Stage 3 chronic kidney disease, unspecified whether stage 3a or 3b CKD (H)  Comment: mild CKD.  Component      Latest Ref Rng & Units 2/3/2022   Sodium      136 - 145 mmol/L 142   Potassium      3.5 - 5.0 mmol/L 4.3   Chloride      98 - 107 mmol/L 108 (H)   Carbon Dioxide      22 - 31 mmol/L 22   Anion Gap      5 - 18 mmol/L 12   Urea Nitrogen      8 - 28 mg/dL 34 (H)   Creatinine      0.60 - 1.10 mg/dL 1.14 (H)   Calcium      8.5 - 10.5 mg/dL 10.2   Glucose      70 - 125 mg/dL 68 (L)   Alkaline Phosphatase      45 - 120 U/L 68   AST      0 - 40 U/L 28   ALT      0 - 45 U/L 19   Protein Total      6.0 - 8.0 g/dL 6.9   Albumin      3.5 - 5.0 g/dL 4.0   Bilirubin Total      0.0 - 1.0 mg/dL 0.6   GFR Estimate      >60 mL/min/1.73m2 46 (L)           Patient was educated on safety of Prolia utilizing Patient Counseling Chart for Healthcare Providers, as outlined by the Prolia REMS progam.     Return in about 6 months (around 2/9/2023) for Prolia with [facility].    Patient Instructions   Prolia 17th today.  Prolia 18th in 6 months with my nurse. I will see you in 1 year.    DXA in 1/2023.   Phone number to schedule [phone].    Daily calcium need is 5450-3325 mg a day from the diet and supplements.  Calcium citrate is easier to digest.  Vitamin D 2000 IU daily recommended.    Risk of rebound vertebral fractures is  higher when Prolia suddenly stopped or dose was missed.      Prolia and Covid vaccine should be  for at least a week.           /80   Pulse 71   Wt 58.5 kg (129 lb)   SpO2 99%   BMI 20.66 kg/m        Did you experience any problems with previous Prolia injection? no  Any medication change in the last 6 months? no  Did you take prednisone or other immunosupressant drugs in the last 6 months   (chemo, transplant, rheum, dermatology conditions)? no  Did you have any serious infection in the last 6 months?no  Any recent hospitalizations?no  Do you plan any dental work in the next 2-3 months?no  How much calcium do you take daily from the diet and supplements?1200 mg  How much vit D do you take daily? 2000 IU  Last DXA? 1/2021 Reviewed and discussed      Patient is here today for the 18th Prolia injection. Patient tolerated previous injections well.   We discussed calcium and vit D daily needs today.       We discussed high risk of rebound vertebral fractures when Prolia suddenly stopped.    Next Prolia injection will be in 6 months.           This note has been dictated using voice recognition software. Any grammatical or context distortions are unintentional and inherent to the software      Patient Active Problem List   Diagnosis     Hyperlipidemia     Hypertension     Contact Dermatitis     Type 2 diabetes mellitus treated with insulin (H)     History of acute myocardial infarction     Osteoporosis Senile     Chronic kidney disease, stage 3 (H)       Current Outpatient Medications   Medication     amoxicillin (AMOXIL) 500 MG capsule     aspirin 81 MG EC tablet     atorvastatin (LIPITOR) 40 MG tablet     blood glucose (ACCU-CHEK GUIDE) test strip     blood glucose (NO BRAND SPECIFIED) lancets standard     blood glucose meter (GLUCOMETER)     calcium carbonate 1250 MG capsule     cholecalciferol, vitamin D3, 1,000 unit tablet     denosumab (PROLIA) 60 MG/ML SOSY injection     generic lancets  (ACCU-CHEK SOFTCLIX LANCETS)     HUMALOG KWIKPEN 200 UNIT/ML soln     insulin glargine (LANTUS PEN) 100 UNIT/ML pen     insulin lispro (HUMALOG KWIKPEN INSULIN) 100 unit/mL pen     insulin pen needle (31G X 8 MM) 31G X 8 MM miscellaneous     lisinopril-hydrochlorothiazide (ZESTORETIC) 20-12.5 MG tablet     triamcinolone (KENALOG) 0.1 % cream     Current Facility-Administered Medications   Medication     denosumab (PROLIA) injection 60 mg

## 2022-09-25 ENCOUNTER — HEALTH MAINTENANCE LETTER (OUTPATIENT)
Age: 87
End: 2022-09-25

## 2022-10-15 ENCOUNTER — TELEPHONE (OUTPATIENT)
Dept: FAMILY MEDICINE | Facility: CLINIC | Age: 87
End: 2022-10-15

## 2022-10-15 NOTE — TELEPHONE ENCOUNTER
General Call    Contacts       Type Contact Phone/Fax    10/15/2022 12:14 PM CDT Phone (Incoming) Kenyatta Og (Self) [phone] (M)        Reason for Call:  COVID booster and flu    What are your questions or concerns:  Patient is wanting to know if she is okay to get the flu shot at the same time as her COVID booster on 10/26. Please advise.    Date of last appointment with provider: n/a    Could we send this information to you in [employer] or would you prefer to receive a phone call?:   Patient would prefer a phone call   Okay to leave a detailed message?: Yes at Cell number on file:    Telephone Information:   Mobile [phone]

## 2022-10-26 ENCOUNTER — IMMUNIZATION (OUTPATIENT)
Dept: FAMILY MEDICINE | Facility: CLINIC | Age: 87
End: 2022-10-26
Payer: COMMERCIAL

## 2022-10-26 PROCEDURE — 90662 IIV NO PRSV INCREASED AG IM: CPT

## 2022-10-26 PROCEDURE — 0124A COVID-19,PF,PFIZER BOOSTER BIVALENT: CPT

## 2022-10-26 PROCEDURE — G0008 ADMIN INFLUENZA VIRUS VAC: HCPCS

## 2022-10-26 PROCEDURE — 91312 COVID-19,PF,PFIZER BOOSTER BIVALENT: CPT

## 2022-12-06 ENCOUNTER — TELEPHONE (OUTPATIENT)
Dept: FAMILY MEDICINE | Facility: CLINIC | Age: 87
End: 2022-12-06

## 2022-12-06 NOTE — TELEPHONE ENCOUNTER
Reason for call:  Other     Patient called regarding (reason for call): call back    Additional comments: Pt has had diarrhea for 5-6 days now. She was considering taking generic immodium to help but saw it has a kidney warning. She's unsure if she should take it and would like advice.     Phone number to reach patient:  Cell number on file:    Telephone Information:   Mobile [phone]       Best Time:  Any    Can we leave a detailed message on this number?  YES

## 2022-12-09 ENCOUNTER — TELEPHONE (OUTPATIENT)
Dept: FAMILY MEDICINE | Facility: CLINIC | Age: 87
End: 2022-12-09

## 2022-12-09 NOTE — TELEPHONE ENCOUNTER
Reason for call:  Symptom     Symptom or request: Diarrhea    Duration (how long have symptoms been present): Around 1 week    Additional comments: Pt has had diarrhea for nearly a week. She's tried OTC immodium with little relief. Requesting advice and a call back.    Phone number to reach patient:  Cell number on file:    Telephone Information:   Mobile [phone]       Best Time:  Any    Can we leave a detailed message on this number?  YES

## 2022-12-09 NOTE — TELEPHONE ENCOUNTER
I agree with these recommendations.  If diarrhea does not improve by next week, then I would recommend she be seen for further evaluation.

## 2022-12-09 NOTE — TELEPHONE ENCOUNTER
Called patient and spoke to her.   Loose, watery stools 3x/day for nearly a week.   Denies feeling dehydrated and feels like she has been eating and drinking well.  She cannot think of anything that precipitated the diarrhea. No new meds, change in diet, etc.   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 Also notes, that she has been eating more sugar than she usually does because she is diabetic, but couldn't say for sure if that was what was causing it.   Has been taking Imodium as directed, 3x a day (with each loose stool) but has not seen improvement.   Advised her to drink plenty of fluids, try simple foods like BRAT diet, and try a probiotic.   Told her I would route information to PCP for further advice as needed. Routed to PCP. Sharron Benedict RN on 12/9/2022 at 3:23 PM

## 2022-12-16 ENCOUNTER — OFFICE VISIT (OUTPATIENT)
Dept: FAMILY MEDICINE | Facility: CLINIC | Age: 87
End: 2022-12-16
Payer: COMMERCIAL

## 2022-12-16 ENCOUNTER — TELEPHONE (OUTPATIENT)
Dept: FAMILY MEDICINE | Facility: CLINIC | Age: 87
End: 2022-12-16

## 2022-12-16 VITALS
HEART RATE: 106 BPM | TEMPERATURE: 99 F | BODY MASS INDEX: 19.09 KG/M2 | WEIGHT: 119.2 LBS | OXYGEN SATURATION: 99 % | DIASTOLIC BLOOD PRESSURE: 60 MMHG | SYSTOLIC BLOOD PRESSURE: 98 MMHG

## 2022-12-16 DIAGNOSIS — Z79.4 TYPE 2 DIABETES MELLITUS WITH DIABETIC AUTONOMIC NEUROPATHY, WITH LONG-TERM CURRENT USE OF INSULIN (H): ICD-10-CM

## 2022-12-16 DIAGNOSIS — N18.30 STAGE 3 CHRONIC KIDNEY DISEASE, UNSPECIFIED WHETHER STAGE 3A OR 3B CKD (H): ICD-10-CM

## 2022-12-16 DIAGNOSIS — R19.7 DIARRHEA, UNSPECIFIED TYPE: Primary | ICD-10-CM

## 2022-12-16 DIAGNOSIS — E11.43 TYPE 2 DIABETES MELLITUS WITH DIABETIC AUTONOMIC NEUROPATHY, WITH LONG-TERM CURRENT USE OF INSULIN (H): ICD-10-CM

## 2022-12-16 LAB
ALBUMIN SERPL BCG-MCNC: 4.1 G/DL (ref 3.5–5.2)
ALP SERPL-CCNC: 84 U/L (ref 35–104)
ALT SERPL W P-5'-P-CCNC: 16 U/L (ref 10–35)
ANION GAP SERPL CALCULATED.3IONS-SCNC: 14 MMOL/L (ref 7–15)
AST SERPL W P-5'-P-CCNC: 32 U/L (ref 10–35)
BASOPHILS # BLD AUTO: 0 10E3/UL (ref 0–0.2)
BASOPHILS NFR BLD AUTO: 0 %
BILIRUB SERPL-MCNC: 0.5 MG/DL
BUN SERPL-MCNC: 29 MG/DL (ref 8–23)
CALCIUM SERPL-MCNC: 9.6 MG/DL (ref 8.8–10.2)
CHLORIDE SERPL-SCNC: 103 MMOL/L (ref 98–107)
CREAT SERPL-MCNC: 1.37 MG/DL (ref 0.51–0.95)
DEPRECATED HCO3 PLAS-SCNC: 23 MMOL/L (ref 22–29)
EOSINOPHIL # BLD AUTO: 0 10E3/UL (ref 0–0.7)
EOSINOPHIL NFR BLD AUTO: 1 %
ERYTHROCYTE [DISTWIDTH] IN BLOOD BY AUTOMATED COUNT: 14.7 % (ref 10–15)
GFR SERPL CREATININE-BSD FRML MDRD: 37 ML/MIN/1.73M2
GLUCOSE SERPL-MCNC: 143 MG/DL (ref 70–99)
HCT VFR BLD AUTO: 40.8 % (ref 35–47)
HGB BLD-MCNC: 13.5 G/DL (ref 11.7–15.7)
IMM GRANULOCYTES # BLD: 0 10E3/UL
IMM GRANULOCYTES NFR BLD: 0 %
LYMPHOCYTES # BLD AUTO: 1.6 10E3/UL (ref 0.8–5.3)
LYMPHOCYTES NFR BLD AUTO: 21 %
MCH RBC QN AUTO: 31.5 PG (ref 26.5–33)
MCHC RBC AUTO-ENTMCNC: 33.1 G/DL (ref 31.5–36.5)
MCV RBC AUTO: 95 FL (ref 78–100)
MONOCYTES # BLD AUTO: 0.7 10E3/UL (ref 0–1.3)
MONOCYTES NFR BLD AUTO: 9 %
NEUTROPHILS # BLD AUTO: 5.5 10E3/UL (ref 1.6–8.3)
NEUTROPHILS NFR BLD AUTO: 70 %
PLATELET # BLD AUTO: 314 10E3/UL (ref 150–450)
POTASSIUM SERPL-SCNC: 4.1 MMOL/L (ref 3.4–5.3)
PROT SERPL-MCNC: 7.2 G/DL (ref 6.4–8.3)
RBC # BLD AUTO: 4.28 10E6/UL (ref 3.8–5.2)
SODIUM SERPL-SCNC: 140 MMOL/L (ref 136–145)
TSH SERPL DL<=0.005 MIU/L-ACNC: 1.87 UIU/ML (ref 0.3–4.2)
WBC # BLD AUTO: 7.9 10E3/UL (ref 4–11)

## 2022-12-16 PROCEDURE — 85027 COMPLETE CBC AUTOMATED: CPT | Performed by: FAMILY MEDICINE

## 2022-12-16 PROCEDURE — 99213 OFFICE O/P EST LOW 20 MIN: CPT | Performed by: FAMILY MEDICINE

## 2022-12-16 PROCEDURE — 84443 ASSAY THYROID STIM HORMONE: CPT | Performed by: FAMILY MEDICINE

## 2022-12-16 PROCEDURE — 80053 COMPREHEN METABOLIC PANEL: CPT | Performed by: FAMILY MEDICINE

## 2022-12-16 PROCEDURE — 36415 COLL VENOUS BLD VENIPUNCTURE: CPT | Performed by: FAMILY MEDICINE

## 2022-12-16 NOTE — PROGRESS NOTES
Assessment & Plan     Diarrhea, unspecified type  Will check stool for C. difficile and obtain stool culture.  Check TSH and comprehensive metabolic panel today as well as CBC.  Okay for diet as tolerated.  Encouraged adequate hydration.  We will try Citrucel fiber supplement.  Further recommendations will be made once results are available  - C. DIFF TOXIN A & B, BY EIA  - Enteric Bacteria and Virus Panel by NING Stool  - Comprehensive metabolic panel (BMP + Alb, Alk Phos, ALT, AST, Total. Bili, TP)  - TSH  - CBC with platelets and differential  - Comprehensive metabolic panel (BMP + Alb, Alk Phos, ALT, AST, Total. Bili, TP)  - TSH  - CBC with platelets and differential    Stage 3 chronic kidney disease, unspecified whether stage 3a or 3b CKD (H)  We will continue to monitor.  Check CMP today    Type 2 diabetes mellitus with diabetic autonomic neuropathy, with long-term current use of insulin (H)  She will continue her current insulin.  She does not want to have her A1c checked today.  Has annual wellness in February and will have diabetic follow-up at that time.  - Enteric Bacteria and Virus Panel by NING Stool                   No follow-ups on file.    Jaz Lazar MD  [facility]    Marty You is a 88 year old, presenting for the following health issues:  Diarrhea (X 2 weeks doing the brat diet  - not improving)      HPI   She is seen today for evaluation of diarrhea.  She has been experiencing diarrhea for the past 2 weeks.  About 3 weeks ago she started noticing some increased gassiness and then the diarrhea began.  Diarrhea is light brown in color.  She has not had blood in her stools.  She is otherwise feeling well.  Has not had abdominal pain or cramping.  No nausea or vomiting.  Energy is good.  She is eating and drinking.  She has been trying to follow a brat diet.  She has also started a probiotic.  That has helped a little bit.  She has not had any change in  medications.  No recent travel.  No fevers or chills.  No recent antibiotics.  Review of systems is otherwise negative.  No other concerns or questions.        Review of Systems         Objective    BP 98/60 (BP Location: Left arm, Cuff Size: Adult Regular)   Pulse 106   Temp 99  F (37.2  C) (Temporal)   Wt 54.1 kg (119 lb 3.2 oz)   SpO2 99%   BMI 19.09 kg/m    Body mass index is 19.09 kg/m .  Physical Exam   GENERAL: healthy, alert and no distress  RESP: lungs clear to auscultation - no rales, rhonchi or wheezes  CV: regular rates and rhythm, normal S1 S2, no S3 or S4 and murmur present  ABDOMEN: soft, nontender, no hepatosplenomegaly, no masses and bowel sounds normal  PSYCH: mentation appears normal, affect normal/bright

## 2022-12-16 NOTE — TELEPHONE ENCOUNTER
Pt is having trouble finding citrucel. Is there another product, or generic, that she can use? Please advise.

## 2022-12-18 PROCEDURE — 87506 IADNA-DNA/RNA PROBE TQ 6-11: CPT

## 2022-12-18 PROCEDURE — 87493 C DIFF AMPLIFIED PROBE: CPT | Mod: 59

## 2022-12-19 ENCOUNTER — LAB (OUTPATIENT)
Dept: LAB | Facility: CLINIC | Age: 87
End: 2022-12-19
Payer: COMMERCIAL

## 2022-12-19 DIAGNOSIS — R19.7 DIARRHEA, UNSPECIFIED TYPE: ICD-10-CM

## 2022-12-19 DIAGNOSIS — E11.43 TYPE 2 DIABETES MELLITUS WITH DIABETIC AUTONOMIC NEUROPATHY, WITH LONG-TERM CURRENT USE OF INSULIN (H): ICD-10-CM

## 2022-12-19 DIAGNOSIS — Z79.4 TYPE 2 DIABETES MELLITUS WITH DIABETIC AUTONOMIC NEUROPATHY, WITH LONG-TERM CURRENT USE OF INSULIN (H): ICD-10-CM

## 2022-12-19 LAB
C COLI+JEJUNI+LARI FUSA STL QL NAA+PROBE: NOT DETECTED
C DIFF TOX B STL QL: NEGATIVE
EC STX1 GENE STL QL NAA+PROBE: NOT DETECTED
EC STX2 GENE STL QL NAA+PROBE: NOT DETECTED
NOROV GI+II ORF1-ORF2 JNC STL QL NAA+PR: NOT DETECTED
RVA NSP5 STL QL NAA+PROBE: NOT DETECTED
SALMONELLA SP RPOD STL QL NAA+PROBE: NOT DETECTED
SHIGELLA SP+EIEC IPAH STL QL NAA+PROBE: NOT DETECTED
V CHOL+PARA RFBL+TRKH+TNAA STL QL NAA+PR: NOT DETECTED
Y ENTERO RECN STL QL NAA+PROBE: NOT DETECTED

## 2022-12-20 ENCOUNTER — TELEPHONE (OUTPATIENT)
Dept: FAMILY MEDICINE | Facility: CLINIC | Age: 87
End: 2022-12-20

## 2022-12-20 NOTE — TELEPHONE ENCOUNTER
----- Message from Jaz Lazar MD sent at 12/20/2022  2:09 PM CST -----  Please let patient know that her stool tests are negative.  No evidence of bacterial infection as cause of her diarrhea.  Has these Citrucel helped?  If not, I would recommend GI consultation as next step

## 2022-12-20 NOTE — TELEPHONE ENCOUNTER
I called patient and relayed results from stool testing. Patient reports that the Citrucel has helped the diarrhea. She does not feel that a GI referral is needed at this time. Patient has no further questions.

## 2023-02-05 DIAGNOSIS — E11.9 TYPE 2 DIABETES MELLITUS WITHOUT COMPLICATION, WITH LONG-TERM CURRENT USE OF INSULIN (H): ICD-10-CM

## 2023-02-05 DIAGNOSIS — Z79.4 TYPE 2 DIABETES MELLITUS WITHOUT COMPLICATION, WITH LONG-TERM CURRENT USE OF INSULIN (H): ICD-10-CM

## 2023-02-06 RX ORDER — BLOOD SUGAR DIAGNOSTIC
STRIP MISCELLANEOUS
Qty: 400 STRIP | Refills: 0 | Status: SHIPPED | OUTPATIENT
Start: 2023-02-06 | End: 2023-08-07

## 2023-02-06 NOTE — TELEPHONE ENCOUNTER
"Last Written Prescription Date:  5/4/22  Last Fill Quantity: 400,  # refills: 3   Last office visit provider:  12/16/22     Requested Prescriptions   Pending Prescriptions Disp Refills     blood glucose (ACCU-CHEK GUIDE) test strip [Pharmacy Med Name: ACCU-CHEK GUIDE TEST STRIPS 50] 400 strip 3     Sig: USE TO TEST BLOOD SUGAR FOUR TIMES DAILY       Diabetic Supplies Protocol Passed - 2/5/2023  9:49 AM        Passed - Medication is active on med list        Passed - Patient is 18 years of age or older        Passed - Recent (6 mo) or future (30 days) visit within the authorizing provider's specialty     Patient had office visit in the last 6 months or has a visit in the next 30 days with authorizing provider.  See \"Patient Info\" tab in inbasket, or \"Choose Columns\" in Meds & Orders section of the refill encounter.                 Ev Lino RN 02/06/23 4:22 PM  "

## 2023-02-09 ENCOUNTER — ALLIED HEALTH/NURSE VISIT (OUTPATIENT)
Dept: FAMILY MEDICINE | Facility: CLINIC | Age: 88
End: 2023-02-09
Payer: COMMERCIAL

## 2023-02-09 DIAGNOSIS — M81.0 SENILE OSTEOPOROSIS: Primary | ICD-10-CM

## 2023-02-09 PROCEDURE — 96372 THER/PROPH/DIAG INJ SC/IM: CPT | Performed by: INTERNAL MEDICINE

## 2023-02-09 PROCEDURE — 99207 PR NO CHARGE NURSE ONLY: CPT

## 2023-02-09 NOTE — PROGRESS NOTES
Indication: Prolia  (denosumab) is a prescription medicine used to treat osteoporosis in patients who:     Are at high risk for fracture, meaning patients who have had a fracture related to osteoporosis, or who have multiple risk factors for fracture     Cannot use another osteoporosis medicine or other osteoporosis medicines did not work well   The timeline for early/late injections would be 4 weeks early and any time after the 6 month yamel. If a patient receives their injection late, then the subsequent injection would be 6 months from the date that they actually received the injection    1.  When was the last injection?  8/9/22  2.  Did they check with their insurance for this calendar year?    3.  Is there an order in the chart? Yes  4.  Has the patient had dental work involving the bone in the past month or will have work in the next 6 months? No   5.  Did you have the patient wait 15 minutes after the injection?  Yes.  6.  Remember to use .injection under the medication notes    The following steps were completed to comply with the REMS program for Prolia:    Reviewed information in the Medication Guide and Patient Counseling Chart, including the serious risks of Prolia  and the symptoms of each risk.    Advised patient to seek prompt medical attention if they have signs or symptoms of any of the serious risks.  Provided each patient a copy of the Medication Guide and Patient Brochure.    Clinic Administered Medication Documentation        Prolia Documentation    Prior to injection, verified patient identity using patient's name and date of birth. Medication was administered. Please see MAR and medication order for additional information. Patient instructed to remain in clinic for 15 minutes and report any adverse reaction to staff immediately .    Indication: Prolia  (denosumab) is a prescription medicine used to treat osteoporosis in patients who:     Are at high risk for fracture, meaning patients who have  had a fracture related to osteoporosis, or who have multiple risk factors for fracture.    Cannot use another osteoporosis medicine or other osteoporosis medicines did not work well.    The timeline for early/late injections would be 4 weeks early and any time after the 6 month yamel. If a patient receives their injection late, then the subsequent injection would be 6 months from the date that they actually received the injection.    When was the last injection?  8/9/22  Was the last injection at least 6 months ago? Yes  Has the prior authorization been completed?  Yes  Is there an active order (within the past 365 days) in the chart?  Yes  Patient denies any dental work involving the bone (e.g. tooth extraction or dental implants) in the past 4 weeks?  Yes  Patient denies plans for any dental work involving the bone (e.g. tooth extraction or dental implants) in the next 4 weeks? Yes    The following steps were completed to comply with the REMS program for Prolia:    Reviewed information in the Medication Guide and Patient Counseling Chart, including the serious risks of Prolia  and the symptoms of each risk.    Advised patient to seek prompt medical attention if they have signs or symptoms of any of the serious risks.    Provided each patient a copy of the Medication Guide and Patient Brochure.    Was entire vial of medication used? Yes  Vial/Syringe: Single dose vial  Expiration Date:  3/31/2025  Was this medication supplied by the patient? No      Ginny Suarez RN, BSN  [facility]

## 2023-02-17 RX ORDER — ATORVASTATIN CALCIUM 40 MG/1
40 TABLET, FILM COATED ORAL
COMMUNITY
End: 2023-02-20

## 2023-02-17 RX ORDER — LANCETS
EACH MISCELLANEOUS
COMMUNITY
Start: 2022-07-30 | End: 2024-04-01

## 2023-02-20 ENCOUNTER — OFFICE VISIT (OUTPATIENT)
Dept: FAMILY MEDICINE | Facility: CLINIC | Age: 88
End: 2023-02-20
Payer: COMMERCIAL

## 2023-02-20 ENCOUNTER — TELEPHONE (OUTPATIENT)
Dept: FAMILY MEDICINE | Facility: CLINIC | Age: 88
End: 2023-02-20

## 2023-02-20 VITALS
TEMPERATURE: 98.5 F | HEART RATE: 86 BPM | SYSTOLIC BLOOD PRESSURE: 130 MMHG | RESPIRATION RATE: 22 BRPM | WEIGHT: 122.4 LBS | DIASTOLIC BLOOD PRESSURE: 72 MMHG | HEIGHT: 66 IN | OXYGEN SATURATION: 98 % | BODY MASS INDEX: 19.67 KG/M2

## 2023-02-20 DIAGNOSIS — Z79.4 TYPE 2 DIABETES MELLITUS WITHOUT COMPLICATION, WITH LONG-TERM CURRENT USE OF INSULIN (H): ICD-10-CM

## 2023-02-20 DIAGNOSIS — M81.0 SENILE OSTEOPOROSIS: ICD-10-CM

## 2023-02-20 DIAGNOSIS — I35.0 NONRHEUMATIC AORTIC VALVE STENOSIS: ICD-10-CM

## 2023-02-20 DIAGNOSIS — I10 ESSENTIAL HYPERTENSION: ICD-10-CM

## 2023-02-20 DIAGNOSIS — Z79.2 NEED FOR PROPHYLACTIC ANTIBIOTIC: ICD-10-CM

## 2023-02-20 DIAGNOSIS — E78.2 MIXED HYPERLIPIDEMIA: ICD-10-CM

## 2023-02-20 DIAGNOSIS — Z00.00 MEDICARE ANNUAL WELLNESS VISIT, SUBSEQUENT: Primary | ICD-10-CM

## 2023-02-20 DIAGNOSIS — N39.0 ACUTE UTI: Primary | ICD-10-CM

## 2023-02-20 DIAGNOSIS — E11.9 TYPE 2 DIABETES MELLITUS WITHOUT COMPLICATION, WITH LONG-TERM CURRENT USE OF INSULIN (H): ICD-10-CM

## 2023-02-20 DIAGNOSIS — N18.30 STAGE 3 CHRONIC KIDNEY DISEASE, UNSPECIFIED WHETHER STAGE 3A OR 3B CKD (H): ICD-10-CM

## 2023-02-20 LAB
ALBUMIN SERPL BCG-MCNC: 4.2 G/DL (ref 3.5–5.2)
ALBUMIN UR-MCNC: NEGATIVE MG/DL
ALP SERPL-CCNC: 80 U/L (ref 35–104)
ALT SERPL W P-5'-P-CCNC: 25 U/L (ref 10–35)
ANION GAP SERPL CALCULATED.3IONS-SCNC: 15 MMOL/L (ref 7–15)
APPEARANCE UR: ABNORMAL
AST SERPL W P-5'-P-CCNC: 42 U/L (ref 10–35)
BACTERIA #/AREA URNS HPF: ABNORMAL /HPF
BILIRUB SERPL-MCNC: 0.5 MG/DL
BILIRUB UR QL STRIP: NEGATIVE
BUN SERPL-MCNC: 22.9 MG/DL (ref 8–23)
CALCIUM SERPL-MCNC: 10.1 MG/DL (ref 8.8–10.2)
CHLORIDE SERPL-SCNC: 105 MMOL/L (ref 98–107)
CHOLEST SERPL-MCNC: 155 MG/DL
COLOR UR AUTO: YELLOW
CREAT SERPL-MCNC: 1.06 MG/DL (ref 0.51–0.95)
CREAT UR-MCNC: 112 MG/DL
DEPRECATED HCO3 PLAS-SCNC: 22 MMOL/L (ref 22–29)
GFR SERPL CREATININE-BSD FRML MDRD: 50 ML/MIN/1.73M2
GLUCOSE SERPL-MCNC: 218 MG/DL (ref 70–99)
GLUCOSE UR STRIP-MCNC: NEGATIVE MG/DL
HBA1C MFR BLD: 5.8 % (ref 0–5.6)
HDLC SERPL-MCNC: 70 MG/DL
HGB UR QL STRIP: ABNORMAL
HYALINE CASTS #/AREA URNS LPF: ABNORMAL /LPF
KETONES UR STRIP-MCNC: NEGATIVE MG/DL
LDLC SERPL CALC-MCNC: 59 MG/DL
LEUKOCYTE ESTERASE UR QL STRIP: ABNORMAL
MICROALBUMIN UR-MCNC: 12.8 MG/L
MICROALBUMIN/CREAT UR: 11.43 MG/G CR (ref 0–25)
NITRATE UR QL: POSITIVE
NONHDLC SERPL-MCNC: 85 MG/DL
PH UR STRIP: 5.5 [PH] (ref 5–8)
POTASSIUM SERPL-SCNC: 4.3 MMOL/L (ref 3.4–5.3)
PROT SERPL-MCNC: 7.1 G/DL (ref 6.4–8.3)
RBC #/AREA URNS AUTO: ABNORMAL /HPF
SODIUM SERPL-SCNC: 142 MMOL/L (ref 136–145)
SP GR UR STRIP: 1.02 (ref 1–1.03)
SQUAMOUS #/AREA URNS AUTO: ABNORMAL /LPF
TRIGL SERPL-MCNC: 131 MG/DL
UROBILINOGEN UR STRIP-ACNC: 0.2 E.U./DL
WBC #/AREA URNS AUTO: ABNORMAL /HPF
WBC CLUMPS #/AREA URNS HPF: PRESENT /HPF

## 2023-02-20 PROCEDURE — 80053 COMPREHEN METABOLIC PANEL: CPT | Performed by: FAMILY MEDICINE

## 2023-02-20 PROCEDURE — 82043 UR ALBUMIN QUANTITATIVE: CPT | Performed by: FAMILY MEDICINE

## 2023-02-20 PROCEDURE — 81001 URINALYSIS AUTO W/SCOPE: CPT | Performed by: FAMILY MEDICINE

## 2023-02-20 PROCEDURE — 80061 LIPID PANEL: CPT | Performed by: FAMILY MEDICINE

## 2023-02-20 PROCEDURE — 36415 COLL VENOUS BLD VENIPUNCTURE: CPT | Performed by: FAMILY MEDICINE

## 2023-02-20 PROCEDURE — 87186 SC STD MICRODIL/AGAR DIL: CPT | Performed by: FAMILY MEDICINE

## 2023-02-20 PROCEDURE — G0439 PPPS, SUBSEQ VISIT: HCPCS | Performed by: FAMILY MEDICINE

## 2023-02-20 PROCEDURE — 87086 URINE CULTURE/COLONY COUNT: CPT | Performed by: FAMILY MEDICINE

## 2023-02-20 PROCEDURE — 82570 ASSAY OF URINE CREATININE: CPT | Performed by: FAMILY MEDICINE

## 2023-02-20 PROCEDURE — 83036 HEMOGLOBIN GLYCOSYLATED A1C: CPT | Performed by: FAMILY MEDICINE

## 2023-02-20 RX ORDER — LISINOPRIL AND HYDROCHLOROTHIAZIDE 12.5; 2 MG/1; MG/1
1 TABLET ORAL DAILY
Qty: 90 TABLET | Refills: 3 | Status: SHIPPED | OUTPATIENT
Start: 2023-02-20 | End: 2023-12-21

## 2023-02-20 RX ORDER — ATORVASTATIN CALCIUM 40 MG/1
TABLET, FILM COATED ORAL
Qty: 90 TABLET | Refills: 3 | Status: SHIPPED | OUTPATIENT
Start: 2023-02-20 | End: 2024-04-01

## 2023-02-20 RX ORDER — CEPHALEXIN 500 MG/1
500 CAPSULE ORAL 2 TIMES DAILY
Qty: 14 CAPSULE | Refills: 0 | Status: SHIPPED | OUTPATIENT
Start: 2023-02-20 | End: 2023-02-27

## 2023-02-20 RX ORDER — AMOXICILLIN 500 MG/1
CAPSULE ORAL
Qty: 4 CAPSULE | Refills: 3 | Status: SHIPPED | OUTPATIENT
Start: 2023-02-20

## 2023-02-20 RX ORDER — INSULIN LISPRO 200 [IU]/ML
INJECTION, SOLUTION SUBCUTANEOUS
Qty: 45 ML | Refills: 3 | Status: SHIPPED | OUTPATIENT
Start: 2023-02-20 | End: 2024-07-02

## 2023-02-20 ASSESSMENT — ENCOUNTER SYMPTOMS
FEVER: 0
EYE PAIN: 0
DYSURIA: 0
DIARRHEA: 0
CHILLS: 0
HEADACHES: 0
HEARTBURN: 0
PALPITATIONS: 0
FREQUENCY: 0
SHORTNESS OF BREATH: 0
ABDOMINAL PAIN: 0
HEMATOCHEZIA: 0
DIZZINESS: 0
SORE THROAT: 0
COUGH: 0
PARESTHESIAS: 0
CONSTIPATION: 0
NAUSEA: 0
JOINT SWELLING: 0
WEAKNESS: 0
ARTHRALGIAS: 0
HEMATURIA: 0
MYALGIAS: 0
NERVOUS/ANXIOUS: 0

## 2023-02-20 ASSESSMENT — PAIN SCALES - GENERAL: PAINLEVEL: NO PAIN (0)

## 2023-02-20 ASSESSMENT — ACTIVITIES OF DAILY LIVING (ADL): CURRENT_FUNCTION: NO ASSISTANCE NEEDED

## 2023-02-20 NOTE — LETTER
February 22, 2023      Kenyatta Og  [address] [place] [id number]        Dear ,    We are writing to inform you of your test results.    Your urine sample shows that you have a urinary tract infection.  Prescription for an antibiotic has been sent to your pharmacy    Your A1c is very good.  Your diabetes remains under good control.    Your kidney function remains slightly decreased but has improved.  Please continue to get adequate fluid intake    One of your liver enzymes, the AST, is mildly elevated.  We will plan to monitor this for now and recheck in 6 months    Your cholesterol levels are good.    Resulted Orders   UA macro with reflex to Microscopic and Culture - Clinc Collect   Result Value Ref Range    Color Urine Yellow Colorless, Straw, Light Yellow, Yellow    Appearance Urine Slightly Cloudy (A) Clear    Glucose Urine Negative Negative mg/dL    Bilirubin Urine Negative Negative    Ketones Urine Negative Negative mg/dL    Specific Gravity Urine 1.020 1.005 - 1.030    Blood Urine Trace (A) Negative    pH Urine 5.5 5.0 - 8.0    Protein Albumin Urine Negative Negative mg/dL    Urobilinogen Urine 0.2 0.2, 1.0 E.U./dL    Nitrite Urine Positive (A) Negative    Leukocyte Esterase Urine Small (A) Negative   Albumin Random Urine Quantitative with Creat Ratio   Result Value Ref Range    Creatinine Urine mg/dL 112.0 mg/dL      Comment:      The reference ranges have not been established in urine creatinine. The results should be integrated into the clinical context for interpretation.    Albumin Urine mg/L 12.8 mg/L      Comment:      The reference ranges have not been established in urine albumin. The results should be integrated into the clinical context for interpretation.    Albumin Urine mg/g Cr 11.43 0.00 - 25.00 mg/g Cr      Comment:      Microalbuminuria is defined as an albumin:creatinine ratio of 17 to 299 for males and 25 to 299 for females. A ratio of albumin:creatinine of 300 or  higher is indicative of overt proteinuria.  Due to biologic variability, positive results should be confirmed by a second, first-morning random or 24-hour timed urine specimen. If there is discrepancy, a third specimen is recommended. When 2 out of 3 results are in the microalbuminuria range, this is evidence for incipient nephropathy and warrants increased efforts at glucose control, blood pressure control, and institution of therapy with an angiotensin-converting-enzyme (ACE) inhibitor (if the patient can tolerate it).     HEMOGLOBIN A1C   Result Value Ref Range    Hemoglobin A1C 5.8 (H) 0.0 - 5.6 %      Comment:      Normal <5.7%   Prediabetes 5.7-6.4%    Diabetes 6.5% or higher     Note: Adopted from ADA consensus guidelines.   Lipid panel reflex to direct LDL Non-fasting   Result Value Ref Range    Cholesterol 155 <200 mg/dL    Triglycerides 131 <150 mg/dL    Direct Measure HDL 70 >=50 mg/dL    LDL Cholesterol Calculated 59 <=100 mg/dL    Non HDL Cholesterol 85 <130 mg/dL    Narrative    Cholesterol  Desirable:  <200 mg/dL    Triglycerides  Normal:  Less than 150 mg/dL  Borderline High:  150-199 mg/dL  High:  200-499 mg/dL  Very High:  Greater than or equal to 500 mg/dL    Direct Measure HDL  Female:  Greater than or equal to 50 mg/dL   Male:  Greater than or equal to 40 mg/dL    LDL Cholesterol  Desirable:  <100mg/dL  Above Desirable:  100-129 mg/dL   Borderline High:  130-159 mg/dL   High:  160-189 mg/dL   Very High:  >= 190 mg/dL    Non HDL Cholesterol  Desirable:  130 mg/dL  Above Desirable:  130-159 mg/dL  Borderline High:  160-189 mg/dL  High:  190-219 mg/dL  Very High:  Greater than or equal to 220 mg/dL   Urine Microscopic Exam   Result Value Ref Range    Bacteria Urine Moderate (A) None Seen /HPF    RBC Urine 0-2 0-2 /HPF /HPF    WBC Urine 25-50 (A) 0-5 /HPF /HPF    Squamous Epithelials Urine Few (A) None Seen /LPF    WBC Clumps Urine Present (A) None Seen /HPF    Hyaline Casts Urine 0-2 (A) None Seen  /LPF   Urine Culture   Result Value Ref Range    Culture >100,000 CFU/mL Escherichia coli (A)    Comprehensive metabolic panel (BMP + Alb, Alk Phos, ALT, AST, Total. Bili, TP)   Result Value Ref Range    Sodium 142 136 - 145 mmol/L    Potassium 4.3 3.4 - 5.3 mmol/L    Chloride 105 98 - 107 mmol/L    Carbon Dioxide (CO2) 22 22 - 29 mmol/L    Anion Gap 15 7 - 15 mmol/L    Urea Nitrogen 22.9 8.0 - 23.0 mg/dL    Creatinine 1.06 (H) 0.51 - 0.95 mg/dL    Calcium 10.1 8.8 - 10.2 mg/dL    Glucose 218 (H) 70 - 99 mg/dL    Alkaline Phosphatase 80 35 - 104 U/L    AST 42 (H) 10 - 35 U/L    ALT 25 10 - 35 U/L    Protein Total 7.1 6.4 - 8.3 g/dL    Albumin 4.2 3.5 - 5.2 g/dL    Bilirubin Total 0.5 <=1.2 mg/dL    GFR Estimate 50 (L) >60 mL/min/1.73m2      Comment:      eGFR calculated using 2021 CKD-EPI equation.       If you have any questions or concerns, please call the clinic at the number listed above.       Sincerely,      Jaz Lazar MD

## 2023-02-20 NOTE — TELEPHONE ENCOUNTER
----- Message from Jaz Lazar MD sent at 2/20/2023  2:52 PM CST -----  Attempted to call patient.  No answer.  Please try calling her again and let her know that her urine sample shows that she has a urinary tract infection.  I will send a prescription for an antibiotic to her pharmacy to treat this infection.

## 2023-02-20 NOTE — PROGRESS NOTES
SUBJECTIVE:   Kenyatta is a 88 year old who presents for Preventive Visit.    Reviewed her health history.  She is also here to follow-up on type 2 diabetes, hyperlipidemia and hypertension. ..  She continues to follow with osteoporosis care and continues with Prolia injections.  She continues on calcium and vitamin D supplement.  She continues to monitor her blood sugar several times a day.  Blood sugars remain under good control overall although she has noticed some fluctuation in her blood sugars since she moved in with her best friend Peyton. She attributes this to eating differently.  Eats 3 regular meals and portions are a little larger.    She reports no significant hypoglycemia.  Continues on Lantus sliding scale at bedtime.  Typically does not take more than 12 to 15 units of Lantus per day.  Uses generic Humalog sliding scale with meals.  Counts her carbs and adjust her insulin based on glucose reading and carb counts.  Continues to feel very comfortable managing her diabetes.  She has a history of a mild heart attack many years ago.  She remains on atorvastatin and aspirin.  Blood pressure is well controlled with lisinopril-hydrochlorothiazide.  She was seen for diarrhea in December.  She reports that has cleared up.  Continues on probiotics.  Still uses Citrucel as needed.  She was found to have aortic stenosis on echocardiogram in 2022.  She denies any new concerns with chest pain or pressure.  No dyspnea.  No lower extremity edema.   No back problems.  She denies dizziness and lightheadedness.  Energy is good.  She gets regular vision and dental exams.    We reviewed medications, allergies, family and social history and updated the chart.  Review of systems is assessed and is otherwise negative.  No other concerns or questions today.       Patient has been advised of split billing requirements and indicates understanding: Yes  Are you in the first 12 months of your Medicare coverage?  No    Healthy Habits:  "    In general, how would you rate your overall health?  Good    Frequency of exercise:  2-3 days/week    Duration of exercise:  15-30 minutes    Do you usually eat at least 4 servings of fruit and vegetables a day, include whole grains    & fiber and avoid regularly eating high fat or \"junk\" foods?  Yes    Ability to successfully perform activities of daily living:  No assistance needed    Home Safety:  No safety concerns identified    Hearing Impairment:  No hearing concerns    In the past 6 months, have you been bothered by leaking of urine?  No    In general, how would you rate your overall mental or emotional health?  Good      PHQ-2 Total Score: 0    Additional concerns today:  No      Have you ever done Advance Care Planning? (For example, a Health Directive, POLST, or a discussion with a medical provider or your loved ones about your wishes): Yes, advance care planning is on file.       Fall risk  Fallen 2 or more times in the past year?: No  Any fall with injury in the past year?: No    Cognitive Screening   1) Repeat 3 items (Leader, Season, Table)    2) Clock draw: NORMAL  3) 3 item recall: Recalls 3 objects  Results: 3 items recalled: COGNITIVE IMPAIRMENT LESS LIKELY    Mini-CogTM Copyright S Gretchen. Licensed by the author for use in [facility]; reprinted with permission (sokirsty@[facility]). All rights reserved.      Do you have sleep apnea, excessive snoring or daytime drowsiness?: no    Reviewed and updated as needed this visit by clinical staff   Tobacco  Allergies  Meds              Reviewed and updated as needed this visit by Provider     Meds             Social History     Tobacco Use     Smoking status: Never     Smokeless tobacco: Never   Substance Use Topics     Alcohol use: No     If you drink alcohol do you typically have >3 drinks per day or >7 drinks per week? No    Alcohol Use 2/20/2023   Prescreen: >3 drinks/day or >7 drinks/week? Not Applicable   Prescreen: >3 drinks/day or >7 " drinks/week? -               Current providers sharing in care for this patient include:   Patient Care Team:  Jaz Lazar MD as PCP - General (Family Medicine)  Kashif Darden MD as Physician (Internal Medicine)  Jaz Lazar MD as Assigned PCP    The following health maintenance items are reviewed in Epic and correct as of today:  Health Maintenance   Topic Date Due     URINALYSIS  Never done     MICROALBUMIN  12/09/2021     A1C  08/03/2022     LIPID  02/03/2023     DIABETIC FOOT EXAM  02/03/2023     MEDICARE ANNUAL WELLNESS VISIT  02/03/2023     EYE EXAM  03/17/2023     BMP  12/16/2023     ANNUAL REVIEW OF HM ORDERS  12/16/2023     HEMOGLOBIN  12/16/2023     FALL RISK ASSESSMENT  02/20/2024     DTAP/TDAP/TD IMMUNIZATION (2 - Td or Tdap) 08/28/2024     ADVANCE CARE PLANNING  02/20/2028     PHQ-2 (once per calendar year)  Completed     INFLUENZA VACCINE  Completed     Pneumococcal Vaccine: 65+ Years  Completed     ZOSTER IMMUNIZATION  Completed     COVID-19 Vaccine  Completed     IPV IMMUNIZATION  Aged Out     MENINGITIS IMMUNIZATION  Aged Out           Mammogram Screening - Patient over age 75, has elected to discontinue screenings.  Pertinent mammograms are reviewed under the imaging tab.    Review of Systems   Constitutional: Negative for chills and fever.   HENT: Negative for congestion, ear pain, hearing loss and sore throat.    Eyes: Negative for pain and visual disturbance.   Respiratory: Negative for cough and shortness of breath.    Cardiovascular: Negative for chest pain, palpitations and peripheral edema.   Gastrointestinal: Negative for abdominal pain, constipation, diarrhea, heartburn, hematochezia and nausea.   Genitourinary: Negative for dysuria, frequency, genital sores, hematuria, urgency and vaginal discharge.   Musculoskeletal: Negative for arthralgias, joint swelling and myalgias.   Skin: Negative for rash.   Neurological: Negative for dizziness, weakness, headaches and paresthesias.  "  Psychiatric/Behavioral: Negative for mood changes. The patient is not nervous/anxious.          OBJECTIVE:   /72 (BP Location: Left arm, Patient Position: Sitting, Cuff Size: Adult Regular)   Pulse 86   Temp 98.5  F (36.9  C) (Temporal)   Resp 22   Ht 1.665 m (5' 5.55\")   Wt 55.5 kg (122 lb 6.4 oz)   SpO2 98%   BMI 20.03 kg/m   Estimated body mass index is 20.03 kg/m  as calculated from the following:    Height as of this encounter: 1.665 m (5' 5.55\").    Weight as of this encounter: 55.5 kg (122 lb 6.4 oz).  Physical Exam  GENERAL APPEARANCE: healthy, alert and no distress  EYES: Eyes grossly normal to inspection, PERRL and conjunctivae and sclerae normal  HENT: ear canals and TM's normal, nose and mouth without ulcers or lesions, oropharynx clear and oral mucous membranes moist  RESP: lungs clear to auscultation - no rales, rhonchi or wheezes  BREAST: normal without masses, tenderness or nipple discharge and no palpable axillary masses or adenopathy  CV: regular rates and rhythm, normal S1 S2, no S3 or S4, no peripheral edema, peripheral pulses strong and murmur present  ABDOMEN: soft, nontender, no hepatosplenomegaly, no masses and bowel sounds normal  MS: no musculoskeletal defects are noted and gait is age appropriate without ataxia  SKIN: no suspicious lesions or rashes  NEURO: Normal strength and tone, sensory exam grossly normal, mentation intact and speech normal  DIABETIC FOOT EXAM: normal DP and PT pulses, no trophic changes or ulcerative lesions, normal sensory exam. She has several hammertoes and there is some mild erythema over the dorsum of these toes  PSYCH: mentation appears normal and affect normal/bright        ASSESSMENT / PLAN:   Kenyatta was seen today for annual visit and recheck medication.    Diagnoses and all orders for this visit:    Medicare annual wellness visit, subsequent    Stage 3 chronic kidney disease, unspecified whether stage 3a or 3b CKD (H)  -     Albumin Random " Urine Quantitative with Creat Ratio; Future  -     UA macro with reflex to Microscopic and Culture - Clinc Collect    Need for prophylactic antibiotic  -     amoxicillin (AMOXIL) 500 MG capsule; [AMOXICILLIN (AMOXIL) 500 MG CAPSULE] TAKE 4 CAPSULES BY MOUTH 1 HOUR PRIOR TO DENTAL PROCEDURE.    Mixed hyperlipidemia  -     atorvastatin (LIPITOR) 40 MG tablet; [ATORVASTATIN (LIPITOR) 40 MG TABLET] TAKE 1 TABLET BY MOUTH EVERY DAY    Type 2 diabetes mellitus without complication, with long-term current use of insulin (H)  -     HUMALOG KWIKPEN 200 UNIT/ML soln; Inject up to 50 units daily  -     insulin glargine (LANTUS PEN) 100 UNIT/ML pen; [INSULIN GLARGINE (LANTUS SOLOSTAR PEN) 100 UNIT/ML (3 ML) PEN] Inject 20 units under the skin at bedtime    Essential hypertension  -     lisinopril-hydrochlorothiazide (ZESTORETIC) 20-12.5 MG tablet; Take 1 tablet by mouth daily    Osteoporosis Senile  -     DX Hip/Pelvis/Spine; Future    Nonrheumatic aortic valve stenosis  -     Echocardiogram Complete; Future              COUNSELING:  Reviewed preventive health counseling, as reflected in patient instructions        She reports that she has never smoked. She has never used smokeless tobacco.      Appropriate preventive services were discussed with this patient, including applicable screening as appropriate for cardiovascular disease, diabetes, osteopenia/osteoporosis, and glaucoma.  As appropriate for age/gender, discussed screening for colorectal cancer, prostate cancer, breast cancer, and cervical cancer. Checklist reviewing preventive services available has been given to the patient.    Reviewed patients plan of care and provided an AVS. The Basic Care Plan (routine screening as documented in Health Maintenance) for Kenyatta meets the Care Plan requirement. This Care Plan has been established and reviewed with the Patient.          Jaz Lazar MD  [facility]    Identified Health Risks:

## 2023-02-21 LAB — BACTERIA UR CULT: ABNORMAL

## 2023-03-06 ENCOUNTER — ANCILLARY PROCEDURE (OUTPATIENT)
Dept: BONE DENSITY | Facility: CLINIC | Age: 88
End: 2023-03-06
Attending: FAMILY MEDICINE
Payer: COMMERCIAL

## 2023-03-06 DIAGNOSIS — M81.0 SENILE OSTEOPOROSIS: ICD-10-CM

## 2023-03-06 PROCEDURE — 77080 DXA BONE DENSITY AXIAL: CPT | Mod: TC | Performed by: RADIOLOGY

## 2023-03-10 ENCOUNTER — HOSPITAL ENCOUNTER (OUTPATIENT)
Dept: CARDIOLOGY | Facility: CLINIC | Age: 88
Discharge: HOME | End: 2023-03-10
Attending: FAMILY MEDICINE | Admitting: FAMILY MEDICINE
Payer: COMMERCIAL

## 2023-03-10 DIAGNOSIS — I35.0 NONRHEUMATIC AORTIC VALVE STENOSIS: ICD-10-CM

## 2023-03-10 LAB — LVEF ECHO: NORMAL

## 2023-03-10 PROCEDURE — 255N000002 HC RX 255 OP 636: Performed by: FAMILY MEDICINE

## 2023-03-10 PROCEDURE — 93306 TTE W/DOPPLER COMPLETE: CPT | Mod: 26 | Performed by: INTERNAL MEDICINE

## 2023-03-10 RX ADMIN — PERFLUTREN 3 ML: 6.52 INJECTION, SUSPENSION INTRAVENOUS at 13:57

## 2023-03-21 DIAGNOSIS — E78.2 MIXED HYPERLIPIDEMIA: ICD-10-CM

## 2023-03-21 RX ORDER — ATORVASTATIN CALCIUM 40 MG/1
TABLET, FILM COATED ORAL
Qty: 90 TABLET | Refills: 3 | OUTPATIENT
Start: 2023-03-21

## 2023-06-12 DIAGNOSIS — E11.9 TYPE 2 DIABETES MELLITUS WITHOUT COMPLICATION, WITH LONG-TERM CURRENT USE OF INSULIN (H): ICD-10-CM

## 2023-06-12 DIAGNOSIS — Z79.4 TYPE 2 DIABETES MELLITUS WITHOUT COMPLICATION, WITH LONG-TERM CURRENT USE OF INSULIN (H): ICD-10-CM

## 2023-06-12 RX ORDER — INSULIN GLARGINE 100 [IU]/ML
INJECTION, SOLUTION SUBCUTANEOUS
Qty: 30 ML | Refills: 3 | OUTPATIENT
Start: 2023-06-12

## 2023-06-12 RX ORDER — INSULIN LISPRO 200 [IU]/ML
INJECTION, SOLUTION SUBCUTANEOUS
Qty: 45 ML | Refills: 3 | OUTPATIENT
Start: 2023-06-12

## 2023-06-12 RX ORDER — PEN NEEDLE, DIABETIC 31 GX5/16"
NEEDLE, DISPOSABLE MISCELLANEOUS
Qty: 400 EACH | Refills: 1 | Status: SHIPPED | OUTPATIENT
Start: 2023-06-12

## 2023-06-12 NOTE — TELEPHONE ENCOUNTER
"Requests for Insulin Glargine and Humalog Kwikpen refused, Patient has refills on file    Last Written Prescription Date:  2/20/2023  Last Fill Quantity: 30 ml,  # refills: 3   Last office visit provider:  2/20/2023     Requested Prescriptions   Pending Prescriptions Disp Refills     insulin glargine (LANTUS SOLOSTAR) 100 UNIT/ML pen [Pharmacy Med Name: LANTUS SOLOSTAR PEN INJ 3ML] 30 mL 3     Sig: INJECT 20 UNITS UNDER THE SKIN AT BEDTIME       Long Acting Insulin Protocol Passed - 6/12/2023 10:54 AM        Passed - Serum creatinine on file in past 12 months     Recent Labs   Lab Test 02/20/23  1126   CR 1.06*       Ok to refill medication if creatinine is low          Passed - HgbA1C in past 3 or 6 months     If HgbA1C is 8 or greater, it needs to be on file within the past 3 months.  If less than 8, must be on file within the past 6 months.     Recent Labs   Lab Test 02/20/23  1126   A1C 5.8*             Passed - Medication is active on med list        Passed - Patient is age 18 or older        Passed - Recent (6 mo) or future (30 days) visit within the authorizing provider's specialty     Patient had office visit in the last 6 months or has a visit in the next 30 days with authorizing provider or within the authorizing provider's specialty.  See \"Patient Info\" tab in inbasket, or \"Choose Columns\" in Meds & Orders section of the refill encounter.          Last Written Prescription Date:  2/20/2023  Last Fill Quantity: 45 ml,  # refills: 3   Last office visit provider:  2/20/2023     HUMALOG KWIKPEN 200 UNIT/ML soln [Pharmacy Med Name: HUMALOG 200 U/ML KWIKPEN INJ 3ML] 45 mL 3     Sig: INJECT UP TO 50 UNITS UNDER THE SKIN DAILY       Short Acting Insulin Protocol Passed - 6/12/2023 10:54 AM        Passed - Serum creatinine on file in past 12 months     Recent Labs   Lab Test 02/20/23  1126   CR 1.06*       Ok to refill medication if creatinine is low          Passed - HgbA1C in past 3 or 6 months     If HgbA1C is " "8 or greater, it needs to be on file within the past 3 months.  If less than 8, must be on file within the past 6 months.     Recent Labs   Lab Test 02/20/23  1126   A1C 5.8*             Passed - Medication is active on med list        Passed - Patient is age 18 or older        Passed - Recent (6 mo) or future (30 days) visit within the authorizing provider's specialty     Patient had office visit in the last 6 months or has a visit in the next 30 days with authorizing provider or within the authorizing provider's specialty.  See \"Patient Info\" tab in inbasket, or \"Choose Columns\" in Meds & Orders section of the refill encounter.          Last Written Prescription Date:  2/3/2022  Last Fill Quantity: 400,  # refills: 3   Last office visit provider:  2/20/2023     insulin pen needle (B-D U/F) 31G X 8 MM miscellaneous [Pharmacy Med Name: B-D PEN NDL SHRT [id number](5/16) JOSE ANTONIO]       Sig: USE WITH INSULIN PENS       Diabetic Supplies Protocol Passed - 6/12/2023 10:54 AM        Passed - Medication is active on med list        Passed - Patient is 18 years of age or older        Passed - Recent (6 mo) or future (30 days) visit within the authorizing provider's specialty     Patient had office visit in the last 6 months or has a visit in the next 30 days with authorizing provider.  See \"Patient Info\" tab in inbasket, or \"Choose Columns\" in Meds & Orders section of the refill encounter.                 Kyung Levy RN 06/12/23 4:08 PM      "

## 2023-07-26 DIAGNOSIS — M81.0 SENILE OSTEOPOROSIS: Primary | ICD-10-CM

## 2023-07-26 DIAGNOSIS — Z92.29 PERSONAL HISTORY OF OTHER DRUG THERAPY: ICD-10-CM

## 2023-08-07 DIAGNOSIS — E11.9 TYPE 2 DIABETES MELLITUS WITHOUT COMPLICATION, WITH LONG-TERM CURRENT USE OF INSULIN (H): ICD-10-CM

## 2023-08-07 DIAGNOSIS — Z79.4 TYPE 2 DIABETES MELLITUS WITHOUT COMPLICATION, WITH LONG-TERM CURRENT USE OF INSULIN (H): ICD-10-CM

## 2023-08-07 RX ORDER — BLOOD SUGAR DIAGNOSTIC
STRIP MISCELLANEOUS
Qty: 400 STRIP | Refills: 0 | Status: SHIPPED | OUTPATIENT
Start: 2023-08-07 | End: 2024-04-01

## 2023-08-07 NOTE — TELEPHONE ENCOUNTER
"  Last Written Prescription Date:  2/6/2023  Last Fill Quantity: 400,  # refills: 0   Last office visit provider:  2/20/2023     Requested Prescriptions   Pending Prescriptions Disp Refills    blood glucose (ACCU-CHEK GUIDE) test strip 400 strip 0     Sig: USE TO TEST BLOOD SUGAR FOUR TIMES DAILY       Diabetic Supplies Protocol Passed - 8/7/2023  2:18 PM        Passed - Medication is active on med list        Passed - Patient is 18 years of age or older        Passed - Recent (6 mo) or future (30 days) visit within the authorizing provider's specialty     Patient had office visit in the last 6 months or has a visit in the next 30 days with authorizing provider.  See \"Patient Info\" tab in inbasket, or \"Choose Columns\" in Meds & Orders section of the refill encounter.                 Ca Sam RN 08/07/23 4:06 PM  "

## 2023-08-07 NOTE — TELEPHONE ENCOUNTER
Pt is calling to get her test strips refilled.     Pending Prescriptions:                       Disp   Refills    blood glucose (ACCU-CHEK GUIDE) test [id number] st*0            Sig: USE TO TEST BLOOD SUGAR FOUR TIMES DAILY

## 2023-08-24 ENCOUNTER — OFFICE VISIT (OUTPATIENT)
Dept: INTERNAL MEDICINE | Facility: CLINIC | Age: 88
End: 2023-08-24
Payer: COMMERCIAL

## 2023-08-24 VITALS
WEIGHT: 125.6 LBS | DIASTOLIC BLOOD PRESSURE: 82 MMHG | OXYGEN SATURATION: 99 % | RESPIRATION RATE: 16 BRPM | HEIGHT: 66 IN | BODY MASS INDEX: 20.18 KG/M2 | SYSTOLIC BLOOD PRESSURE: 132 MMHG | HEART RATE: 83 BPM | TEMPERATURE: 98.5 F

## 2023-08-24 DIAGNOSIS — E11.9 TYPE 2 DIABETES MELLITUS TREATED WITH INSULIN (H): ICD-10-CM

## 2023-08-24 DIAGNOSIS — E11.9 TYPE 2 DIABETES MELLITUS WITHOUT COMPLICATION, WITH LONG-TERM CURRENT USE OF INSULIN (H): ICD-10-CM

## 2023-08-24 DIAGNOSIS — Z79.4 TYPE 2 DIABETES MELLITUS WITHOUT COMPLICATION, WITH LONG-TERM CURRENT USE OF INSULIN (H): ICD-10-CM

## 2023-08-24 DIAGNOSIS — I35.0 MODERATE AORTIC STENOSIS: ICD-10-CM

## 2023-08-24 DIAGNOSIS — M81.0 SENILE OSTEOPOROSIS: Primary | ICD-10-CM

## 2023-08-24 DIAGNOSIS — N18.31 STAGE 3A CHRONIC KIDNEY DISEASE (H): ICD-10-CM

## 2023-08-24 DIAGNOSIS — I10 ESSENTIAL HYPERTENSION: ICD-10-CM

## 2023-08-24 DIAGNOSIS — Z79.4 TYPE 2 DIABETES MELLITUS TREATED WITH INSULIN (H): ICD-10-CM

## 2023-08-24 LAB
ALBUMIN SERPL BCG-MCNC: 4.3 G/DL (ref 3.5–5.2)
ALP SERPL-CCNC: 85 U/L (ref 35–104)
ALT SERPL W P-5'-P-CCNC: 22 U/L (ref 0–50)
ANION GAP SERPL CALCULATED.3IONS-SCNC: 11 MMOL/L (ref 7–15)
AST SERPL W P-5'-P-CCNC: 38 U/L (ref 0–45)
BILIRUB SERPL-MCNC: 0.3 MG/DL
BUN SERPL-MCNC: 26.9 MG/DL (ref 8–23)
CALCIUM SERPL-MCNC: 9.7 MG/DL (ref 8.8–10.2)
CHLORIDE SERPL-SCNC: 106 MMOL/L (ref 98–107)
CREAT SERPL-MCNC: 1.12 MG/DL (ref 0.51–0.95)
DEPRECATED CALCIDIOL+CALCIFEROL SERPL-MC: 53 UG/L (ref 20–75)
DEPRECATED HCO3 PLAS-SCNC: 24 MMOL/L (ref 22–29)
GFR SERPL CREATININE-BSD FRML MDRD: 47 ML/MIN/1.73M2
GLUCOSE SERPL-MCNC: 127 MG/DL (ref 70–99)
HBA1C MFR BLD: 6.1 % (ref 0–5.6)
POTASSIUM SERPL-SCNC: 4.7 MMOL/L (ref 3.4–5.3)
PROT SERPL-MCNC: 7.2 G/DL (ref 6.4–8.3)
SODIUM SERPL-SCNC: 141 MMOL/L (ref 136–145)

## 2023-08-24 PROCEDURE — 36415 COLL VENOUS BLD VENIPUNCTURE: CPT | Performed by: INTERNAL MEDICINE

## 2023-08-24 PROCEDURE — 82306 VITAMIN D 25 HYDROXY: CPT | Performed by: INTERNAL MEDICINE

## 2023-08-24 PROCEDURE — 99214 OFFICE O/P EST MOD 30 MIN: CPT | Mod: 25 | Performed by: INTERNAL MEDICINE

## 2023-08-24 PROCEDURE — 80053 COMPREHEN METABOLIC PANEL: CPT | Performed by: INTERNAL MEDICINE

## 2023-08-24 PROCEDURE — 83036 HEMOGLOBIN GLYCOSYLATED A1C: CPT | Performed by: INTERNAL MEDICINE

## 2023-08-24 PROCEDURE — 96372 THER/PROPH/DIAG INJ SC/IM: CPT | Performed by: INTERNAL MEDICINE

## 2023-08-24 NOTE — PATIENT INSTRUCTIONS
Prolia 20th today. Labs today.  Prolia 21st in 6 months with AWV with me.    DXA in 3/2025 .   Phone number to schedule [phone].    Daily calcium need is 3011-1785 mg a day from the diet and supplements.  Calcium citrate is easier to digest.  Vitamin D 2000 IU daily recommended.    Risk of rebound vertebral fractures is higher when Prolia suddenly stopped or dose was missed.      Prolia and Covid vaccine should be  for at least a week.

## 2023-08-24 NOTE — PROGRESS NOTES
87 yo female is here today for osteoporosis follow-up and to establish care with me since her PCP left the Clinic. All chronic medical problems reviewed. She had AWV in 2/2023.She is also here to follow-up on type 2 diabetes, hyperlipidemia and hypertension.  Continues on Lantus sliding scale at bedtime.  Typically does not take more than 12 to 15 units of Lantus per day.  Uses generic Humalog sliding scale with meals.  Counts her carbs and adjust her insulin based on glucose reading and carb counts.  Continues to feel very comfortable managing her diabetes.  She has a history of a mild heart attack many years ago.  She remains on atorvastatin and aspirin.  Blood pressure is well controlled with lisinopril-hydrochlorothiazide.   Still uses Citrucel as needed.  She was found to have moderate aortic stenosis on echocardiogram in 2022.  She denies any new concerns with chest pain or pressure.  No dyspnea.  No lower extremity edema.   No back problems.  She denies dizziness and lightheadedness.  Energy is good.  She gets regular vision and dental exams.    We reviewed medications, allergies, family and social history and updated the chart     (M81.0) Osteoporosis Senile  (primary encounter diagnosis)  Comment: Osteoporosis treatment past - Yes; Bisphosphonates. Osteoporosis treatment current - Yes; Prolia.   Due for Prolia # 20 today.  DXA 3/2023 reviewed and discussed today.  DXA RESULTS  -LEFT Hip Total: BMD: 0.758 g/cm2. T-score: -2.0. Z-score: 0.7.  -LEFT Hip Femoral neck: BMD: 0.739 g/cm2. T-score: -2.2. Z-score: 0.6.        INTERVAL CHANGE  -There has been a 1.2% increase in left femur BMD.    Considering reported general safety on long treatment with Prolia, more than 10 years, I recommended continuing Prolia treatment. Risk of rebound fractures and decline in bone density is reported with Prolia discontinuation and even with switching to bisphosphonate.   Plan: Vitamin D Deficiency            (E11.9,  Z79.4)  Type 2 diabetes mellitus treated with insulin (H)  Comment:   Component      Latest Ref Rng 2/20/2023  11:26 AM   Hemoglobin A1C      0.0 - 5.6 % 5.8 (H)       I am concern about possible hypoglycemia. She will use Lantus 12 U at bedtime only. And Humalog ISS.  Plan: Hemoglobin A1c, Comprehensive metabolic panel            (N18.31) Stage 3a chronic kidney disease (H)  Comment: stable  Component      Latest Ref Rng 2/20/2023  11:26 AM   Sodium      136 - 145 mmol/L 142    Potassium      3.4 - 5.3 mmol/L 4.3    Chloride      98 - 107 mmol/L 105    Carbon Dioxide (CO2)      22 - 29 mmol/L 22    Anion Gap      7 - 15 mmol/L 15    Urea Nitrogen      8.0 - 23.0 mg/dL 22.9    Creatinine      0.51 - 0.95 mg/dL 1.06 (H)    Calcium      8.8 - 10.2 mg/dL 10.1    Glucose      70 - 99 mg/dL 218 (H)    Alkaline Phosphatase      35 - 104 U/L 80    AST      10 - 35 U/L 42 (H)    ALT      10 - 35 U/L 25    Protein Total      6.4 - 8.3 g/dL 7.1    Albumin      3.5 - 5.2 g/dL 4.2    Bilirubin Total      <=1.2 mg/dL 0.5    GFR Estimate      >60 mL/min/1.73m2 50 (L)           (E11.9,  Z79.4) Type 2 diabetes mellitus without complication, with long-term current use of insulin (H)  Comment:   Plan: insulin glargine (LANTUS PEN) 100 UNIT/ML pen            Patient was educated on safety of Prolia utilizing Patient Counseling Chart for Healthcare Providers, as outlined by the Prolia REMS progam.     Return in about 6 months (around 2/24/2024) for AWV, Prolia.    Patient Instructions   Prolia 20th today. Labs today.  Prolia 21st in 6 months with AWV with me.    DXA in 3/2025 .   Phone number to schedule [phone].    Daily calcium need is 6217-2781 mg a day from the diet and supplements.  Calcium citrate is easier to digest.  Vitamin D 2000 IU daily recommended.    Risk of rebound vertebral fractures is higher when Prolia suddenly stopped or dose was missed.      Prolia and Covid vaccine should be  for at least a week.  "          /82   Pulse 83   Temp 98.5  F (36.9  C)   Resp 16   Ht 1.665 m (5' 5.55\")   Wt 57 kg (125 lb 9.6 oz)   SpO2 99%   BMI 20.55 kg/m        Did you experience any problems with previous Prolia injection? no  Any medication change in the last 6 months? no  Did you take prednisone or other immunosupressant drugs in the last 6 months   (chemo, transplant, rheum, dermatology conditions)? no  Did you have any serious infection in the last 6 months?no  Any recent hospitalizations?no  Do you plan any dental work in the next 2-3 months?no  How much calcium do you take daily from the diet and supplements?1200 mg  How much vit D do you take daily? 2000 IU  Last DXA? 3/2023,  Reviewed and discussed      Patient is here today for the th Prolia injection. Patient tolerated previous injections well.   We discussed calcium and vit D daily needs today.       We discussed high risk of rebound vertebral fractures when Prolia suddenly stopped.    Next Prolia injection will be in 6 months.           This note has been dictated using voice recognition software. Any grammatical or context distortions are unintentional and inherent to the software      Patient Active Problem List   Diagnosis    Hyperlipidemia    Hypertension    Contact Dermatitis    Type 2 diabetes mellitus treated with insulin (H)    History of acute myocardial infarction    Osteoporosis Senile    Chronic kidney disease, stage 3 (H)       Current Outpatient Medications   Medication    amoxicillin (AMOXIL) 500 MG capsule    aspirin 81 MG EC tablet    atorvastatin (LIPITOR) 40 MG tablet    blood glucose (ACCU-CHEK GUIDE) test strip    blood glucose (NO BRAND SPECIFIED) lancets standard    blood glucose meter (GLUCOMETER)    blood glucose monitoring (ACCU-CHEK FASTCLIX) lancets    calcium carbonate 1250 MG capsule    cholecalciferol, vitamin D3, 1,000 unit tablet    denosumab (PROLIA) 60 MG/ML SOSY injection    HUMALOG KWIKPEN 200 UNIT/ML soln    insulin " glargine (LANTUS PEN) 100 UNIT/ML pen    insulin pen needle (B-D U/F) 31G X 8 MM miscellaneous    lisinopril-hydrochlorothiazide (ZESTORETIC) 20-12.5 MG tablet     Current Facility-Administered Medications   Medication    denosumab (PROLIA) injection 60 mg

## 2023-10-24 ENCOUNTER — ALLIED HEALTH/NURSE VISIT (OUTPATIENT)
Dept: FAMILY MEDICINE | Facility: CLINIC | Age: 88
End: 2023-10-24
Payer: COMMERCIAL

## 2023-10-24 DIAGNOSIS — Z29.11 NEED FOR VACCINATION AGAINST RESPIRATORY SYNCYTIAL VIRUS: ICD-10-CM

## 2023-10-24 PROCEDURE — 99207 PR NO CHARGE NURSE ONLY: CPT

## 2023-10-24 PROCEDURE — 90678 RSV VACC PREF BIVALENT IM: CPT

## 2023-10-24 PROCEDURE — 90471 IMMUNIZATION ADMIN: CPT

## 2023-10-24 NOTE — PROGRESS NOTES
Prior to immunization administration, verified patients identity using patient s name and date of birth. Please see Immunization Activity for additional information.     Screening Questionnaire for Adult Immunization    Are you sick today?   No   Do you have allergies to medications, food, a vaccine component or latex?   No   Have you ever had a serious reaction after receiving a vaccination?   No   Do you have a long-term health problem with heart, lung, kidney, or metabolic disease (e.g., diabetes), asthma, a blood disorder, no spleen, complement component deficiency, a cochlear implant, or a spinal fluid leak?  Are you on long-term aspirin therapy?   Yes   Do you have cancer, leukemia, HIV/AIDS, or any other immune system problem?   No   Do you have a parent, brother, or sister with an immune system problem?   No   In the past 3 months, have you taken medications that affect  your immune system, such as prednisone, other steroids, or anticancer drugs; drugs for the treatment of rheumatoid arthritis, Crohn s disease, or psoriasis; or have you had radiation treatments?   No   Have you had a seizure, or a brain or other nervous system problem?   No   During the past year, have you received a transfusion of blood or blood    products, or been given immune (gamma) globulin or antiviral drug?   No   For women: Are you pregnant or is there a chance you could become       pregnant during the next month?   No   Have you received any vaccinations in the past 4 weeks?   Yes     Immunization questionnaire was positive for at least one answer.  Notified Dr. Julieth Dubose.    I have reviewed the following standing orders:   This patient is due and qualifies for the RSV vaccine.    Click here for RSV Vaccine Standing Order    I have reviewed the vaccines inclusion and exclusion criteria; No concerns regarding eligibility.     Patient instructed to remain in clinic for 15 minutes afterwards, and to report any adverse reactions.      Screening performed by Jasmina Morrison MA on 10/24/2023 at 10:32 AM.

## 2023-11-08 DIAGNOSIS — E11.9 TYPE 2 DIABETES MELLITUS WITHOUT COMPLICATION, WITH LONG-TERM CURRENT USE OF INSULIN (H): ICD-10-CM

## 2023-11-08 DIAGNOSIS — Z79.4 TYPE 2 DIABETES MELLITUS WITHOUT COMPLICATION, WITH LONG-TERM CURRENT USE OF INSULIN (H): ICD-10-CM

## 2023-11-08 RX ORDER — INSULIN GLARGINE 100 [IU]/ML
INJECTION, SOLUTION SUBCUTANEOUS
Qty: 30 ML | Refills: 3 | Status: SHIPPED | OUTPATIENT
Start: 2023-11-08

## 2023-11-15 ENCOUNTER — IMMUNIZATION (OUTPATIENT)
Dept: NURSING | Facility: CLINIC | Age: 88
End: 2023-11-15
Payer: COMMERCIAL

## 2023-11-15 PROCEDURE — 91320 SARSCV2 VAC 30MCG TRS-SUC IM: CPT

## 2023-11-15 PROCEDURE — 90480 ADMN SARSCOV2 VAC 1/ONLY CMP: CPT

## 2023-12-21 DIAGNOSIS — I10 ESSENTIAL HYPERTENSION: ICD-10-CM

## 2023-12-21 RX ORDER — LISINOPRIL AND HYDROCHLOROTHIAZIDE 12.5; 2 MG/1; MG/1
1 TABLET ORAL DAILY
Qty: 30 TABLET | Refills: 0 | Status: SHIPPED | OUTPATIENT
Start: 2023-12-21 | End: 2024-03-19

## 2024-01-05 DIAGNOSIS — E78.2 MIXED HYPERLIPIDEMIA: ICD-10-CM

## 2024-01-05 RX ORDER — ATORVASTATIN CALCIUM 40 MG/1
TABLET, FILM COATED ORAL
Qty: 90 TABLET | Refills: 3 | OUTPATIENT
Start: 2024-01-05

## 2024-03-02 ENCOUNTER — HEALTH MAINTENANCE LETTER (OUTPATIENT)
Age: 89
End: 2024-03-02

## 2024-03-05 ENCOUNTER — OFFICE VISIT (OUTPATIENT)
Dept: INTERNAL MEDICINE | Facility: CLINIC | Age: 89
End: 2024-03-05
Payer: COMMERCIAL

## 2024-03-05 VITALS
HEART RATE: 85 BPM | HEIGHT: 66 IN | TEMPERATURE: 97.9 F | WEIGHT: 122.4 LBS | RESPIRATION RATE: 16 BRPM | BODY MASS INDEX: 19.67 KG/M2 | DIASTOLIC BLOOD PRESSURE: 74 MMHG | SYSTOLIC BLOOD PRESSURE: 122 MMHG | OXYGEN SATURATION: 97 %

## 2024-03-05 DIAGNOSIS — I10 ESSENTIAL HYPERTENSION: ICD-10-CM

## 2024-03-05 DIAGNOSIS — Z79.4 TYPE 2 DIABETES MELLITUS TREATED WITH INSULIN (H): ICD-10-CM

## 2024-03-05 DIAGNOSIS — I35.0 MODERATE AORTIC STENOSIS: ICD-10-CM

## 2024-03-05 DIAGNOSIS — E11.9 TYPE 2 DIABETES MELLITUS TREATED WITH INSULIN (H): ICD-10-CM

## 2024-03-05 DIAGNOSIS — N18.31 STAGE 3A CHRONIC KIDNEY DISEASE (H): ICD-10-CM

## 2024-03-05 DIAGNOSIS — Z00.00 ENCOUNTER FOR ANNUAL WELLNESS VISIT (AWV) IN MEDICARE PATIENT: Primary | ICD-10-CM

## 2024-03-05 DIAGNOSIS — E78.2 MIXED HYPERLIPIDEMIA: ICD-10-CM

## 2024-03-05 DIAGNOSIS — M81.0 SENILE OSTEOPOROSIS: ICD-10-CM

## 2024-03-05 LAB
ERYTHROCYTE [DISTWIDTH] IN BLOOD BY AUTOMATED COUNT: 14.1 % (ref 10–15)
HBA1C MFR BLD: 6.5 % (ref 0–5.6)
HCT VFR BLD AUTO: 41 % (ref 35–47)
HGB BLD-MCNC: 13.1 G/DL (ref 11.7–15.7)
MCH RBC QN AUTO: 31.5 PG (ref 26.5–33)
MCHC RBC AUTO-ENTMCNC: 32 G/DL (ref 31.5–36.5)
MCV RBC AUTO: 99 FL (ref 78–100)
PLATELET # BLD AUTO: 218 10E3/UL (ref 150–450)
RBC # BLD AUTO: 4.16 10E6/UL (ref 3.8–5.2)
WBC # BLD AUTO: 5.6 10E3/UL (ref 4–11)

## 2024-03-05 PROCEDURE — 36415 COLL VENOUS BLD VENIPUNCTURE: CPT | Performed by: INTERNAL MEDICINE

## 2024-03-05 PROCEDURE — 83721 ASSAY OF BLOOD LIPOPROTEIN: CPT | Performed by: INTERNAL MEDICINE

## 2024-03-05 PROCEDURE — 99214 OFFICE O/P EST MOD 30 MIN: CPT | Mod: 25 | Performed by: INTERNAL MEDICINE

## 2024-03-05 PROCEDURE — 82306 VITAMIN D 25 HYDROXY: CPT | Performed by: INTERNAL MEDICINE

## 2024-03-05 PROCEDURE — 85027 COMPLETE CBC AUTOMATED: CPT | Performed by: INTERNAL MEDICINE

## 2024-03-05 PROCEDURE — 82570 ASSAY OF URINE CREATININE: CPT | Performed by: INTERNAL MEDICINE

## 2024-03-05 PROCEDURE — 96372 THER/PROPH/DIAG INJ SC/IM: CPT | Performed by: INTERNAL MEDICINE

## 2024-03-05 PROCEDURE — G0439 PPPS, SUBSEQ VISIT: HCPCS | Performed by: INTERNAL MEDICINE

## 2024-03-05 PROCEDURE — 80053 COMPREHEN METABOLIC PANEL: CPT | Performed by: INTERNAL MEDICINE

## 2024-03-05 PROCEDURE — 82043 UR ALBUMIN QUANTITATIVE: CPT | Performed by: INTERNAL MEDICINE

## 2024-03-05 PROCEDURE — 83036 HEMOGLOBIN GLYCOSYLATED A1C: CPT | Performed by: INTERNAL MEDICINE

## 2024-03-05 NOTE — PATIENT INSTRUCTIONS
Prolia 21st today. Labs today.   Prolia 22nd in 6 months.    DXA in 3/2025 .   Phone number to schedule [phone].    Daily calcium need is 4441-2109 mg a day from the diet and supplements.  Calcium citrate is easier to digest.  Vitamin D 2000 IU daily recommended.    Risk of rebound vertebral fractures is higher when Prolia suddenly stopped or dose was missed.      Prolia and Covid vaccine should be  for at least a week.

## 2024-03-05 NOTE — PROGRESS NOTES
Preventive Care Visit  [facility]  Kashif Darden MD, Internal Medicine  Mar 5, 2024    Assessment & Plan     Encounter for annual wellness visit (AWV) in Medicare patient      Osteoporosis Senile   Osteoporosis treatment past - Yes; Bisphosphonates. Osteoporosis treatment current - Yes; Prolia.   Due for Prolia # 21 today.  DXA 3/2023 reviewed and discussed today.   Considering reported general safety on long treatment with Prolia, more than 10 years, I recommended continuing Prolia treatment. Risk of rebound fractures and decline in bone density is reported with Prolia discontinuation and even with switching to bisphosphonate.      The following steps were completed to comply with the REMS program for Prolia:    Reviewed information in the Medication Guide and Patient Counseling Chart, including the serious risks of Prolia  and the symptoms of each risk.    Advised patient to seek prompt medical attention if they have signs or symptoms of any of the serious risks.    Provided each patient a copy of the Medication Guide and Patient Brochure   - Vitamin D Deficiency; Future    Stage 3a chronic kidney disease (H)  Stable  Component      Latest Ref Rng 8/24/2023  10:04 AM   Sodium      136 - 145 mmol/L 141    Potassium      3.4 - 5.3 mmol/L 4.7    Chloride      98 - 107 mmol/L 106    Carbon Dioxide (CO2)      22 - 29 mmol/L 24    Anion Gap      7 - 15 mmol/L 11    Urea Nitrogen      8.0 - 23.0 mg/dL 26.9 (H)    Creatinine      0.51 - 0.95 mg/dL 1.12 (H)    Calcium      8.8 - 10.2 mg/dL 9.7    Glucose      70 - 99 mg/dL 127 (H)    Alkaline Phosphatase      35 - 104 U/L 85    AST      0 - 45 U/L 38    ALT      0 - 50 U/L 22    Protein Total      6.4 - 8.3 g/dL 7.2    Albumin      3.5 - 5.2 g/dL 4.3    Bilirubin Total      <=1.2 mg/dL 0.3    GFR Estimate      >60 mL/min/1.73m2 47 (L)         - Albumin Random Urine Quantitative with Creat Ratio; Future  - CBC with platelets; Future  -  Comprehensive metabolic panel; Future    Type 2 diabetes mellitus treated with insulin (H)  Component      Latest Ref Rng 8/24/2023  10:04 AM   Hemoglobin A1C      0.0 - 5.6 % 6.1 (H)     Continues on Lantus sliding scale at bedtime.  Typically does not take more than 12 to 15 units of Lantus per day.  Uses generic Humalog sliding scale with meals.  Counts her carbs and adjust her insulin based on glucose reading and carb counts.  Continues to feel very comfortable managing her diabetes.     - HEMOGLOBIN A1C; Future  - Adult Eye  Referral; Future  - Comprehensive metabolic panel; Future    Moderate aortic stenosis  She denies having a history of a mild heart attack many years ago.  She was found to have moderate aortic stenosis on echocardiogram in 2023.    Hypertension   Blood pressure is well controlled with lisinopril-hydrochlorothiazide.    - Comprehensive metabolic panel; Future    Mixed hyperlipidemia  On statin.  - LDL cholesterol direct; Future        Patient Instructions   Prolia 21st today. Labs today.   Prolia 22nd in 6 months.    DXA in 3/2025 .   Phone number to schedule [phone].    Daily calcium need is 2464-2535 mg a day from the diet and supplements.  Calcium citrate is easier to digest.  Vitamin D 2000 IU daily recommended.    Risk of rebound vertebral fractures is higher when Prolia suddenly stopped or dose was missed.      Prolia and Covid vaccine should be  for at least a week.            Marty You is a 89 year old, presenting for the following:  Wellness Visit (Mammo 7/13/21 DXA 3/06/23 )        3/5/2024     1:01 PM   Additional Questions   Roomed by Shannon         Health Care Directive  Patient does not have a Health Care Directive or Living Will: Patient states has Advance Directive and will bring in a copy to clinic.    HPI              2/20/2023   General Health   How would you rate your overall physical health? Good         2/20/2023   Nutrition   At least 4  servings of fruits and vegetables/day Yes         2/20/2023   Exercise   Frequency of exercise: 2-3 days/week            3/5/2024   Fall Risk   Fallen 2 or more times in the past year? No   Trouble with walking or balance? No          2/20/2023   Activities of Daily Living- Home Safety   Needs help with the following daily activites NO assistance is needed   Safety concerns in the home None of the above          No data to display                  2/20/2023   Hearing Screening   Hearing concerns? No concerns            No data to display                     Today's PHQ-2 Score:       3/5/2024     1:07 PM   PHQ-2 ( 1999 [employer])   Q1: Little interest or pleasure in doing things 0   Q2: Feeling down, depressed or hopeless 0   PHQ-2 Score 0           2/20/2023   Substance Use   Alcohol more than 3/day or more than 7/wk Not Applicable     Social History     Tobacco Use    Smoking status: Never     Passive exposure: Never    Smokeless tobacco: Never   Vaping Use    Vaping Use: Never used   Substance Use Topics    Alcohol use: No    Drug use: No                    Reviewed and updated as needed this visit by Provider                      Current providers sharing in care for this patient include:  Patient Care Team:  Kashif Darden MD as PCP - General (Internal Medicine)  Kashif Darden MD as Physician (Internal Medicine)  Kashif Darden MD as Assigned PCP    The following health maintenance items are reviewed in Epic and correct as of today:  Health Maintenance   Topic Date Due    LIPID  02/20/2024    MICROALBUMIN  02/20/2024    DIABETIC FOOT EXAM  02/20/2024    A1C  02/24/2024    HEMOGLOBIN  12/16/2023    MEDICARE ANNUAL WELLNESS VISIT  02/20/2024    EYE EXAM  03/29/2024    BMP  08/24/2024    DTAP/TDAP/TD IMMUNIZATION (2 - Td or Tdap) 08/28/2024    ANNUAL REVIEW OF HM ORDERS  03/05/2025    FALL RISK ASSESSMENT  03/05/2025    ADVANCE CARE PLANNING  02/20/2028    PHQ-2 (once per calendar year)  Completed    INFLUENZA  "VACCINE  Completed    Pneumococcal Vaccine: 65+ Years  Completed    URINALYSIS  Completed    ZOSTER IMMUNIZATION  Completed    RSV VACCINE (Pregnancy & 60+)  Completed    COVID-19 Vaccine  Completed    IPV IMMUNIZATION  Aged Out    HPV IMMUNIZATION  Aged Out    MENINGITIS IMMUNIZATION  Aged Out    RSV MONOCLONAL ANTIBODY  Aged Out            Objective    Exam  /74   Pulse 85   Temp 97.9  F (36.6  C)   Resp 16   Ht 1.665 m (5' 5.55\")   Wt 55.5 kg (122 lb 6.4 oz)   LMP  (LMP Unknown)   SpO2 97%   BMI 20.03 kg/m     Estimated body mass index is 20.03 kg/m  as calculated from the following:    Height as of this encounter: 1.665 m (5' 5.55\").    Weight as of this encounter: 55.5 kg (122 lb 6.4 oz).    Physical Exam  General Appearance: Alert, cooperative, no distress, appears stated age.  Head: Normocephalic, without obvious abnormality, atraumatic  Eyes: PERRL, conjunctiva/corneas clear, EOM's intact  Ears: Normal TM's and external ear canals, both ears  Nose: Nares normal, septum midline,mucosa normal, no drainage  Throat: Lips, mucosa, and tongue normal; teeth and gums normal  Neck: Supple, symmetrical, trachea midline, no adenopathy;  thyroid: not enlarged, symmetric, no tenderness/mass/nodules; no carotid bruit or JVD  Back: Symmetric, no curvature, ROM normal, no CVA tenderness.  Lungs: Clear to auscultation bilaterally, respirations unlabored.  Breasts: No breast masses, tenderness, asymmetry, or nipple discharge.  Heart: Regular rate and rhythm, S1 and S2 normal, 2/6 sysmurmur, no rub, or gallop.  Abdomen: Soft, non-tender, bowel sounds active all four quadrants,  no masses, no organomegaly.  Extremities: Extremities normal, atraumatic, no cyanosis or edema.  Skin: Skin color, texture, turgor normal, no rashes or lesions.  Lymph nodes: Cervical, supraclavicular, and axillary nodes normal.  Neurologic: No focal neurological findings.           3/5/2024   Mini Cog   Clock Draw Score 2 Normal   3 Item " Recall 3 objects recalled   Mini Cog Total Score 5            Signed Electronically by: Kashif Darden MD

## 2024-03-06 LAB
ALBUMIN SERPL BCG-MCNC: 4.4 G/DL (ref 3.5–5.2)
ALP SERPL-CCNC: 74 U/L (ref 40–150)
ALT SERPL W P-5'-P-CCNC: 20 U/L (ref 0–50)
ANION GAP SERPL CALCULATED.3IONS-SCNC: 11 MMOL/L (ref 7–15)
AST SERPL W P-5'-P-CCNC: 27 U/L (ref 0–45)
BILIRUB SERPL-MCNC: 0.5 MG/DL
BUN SERPL-MCNC: 26.3 MG/DL (ref 8–23)
CALCIUM SERPL-MCNC: 10.3 MG/DL (ref 8.8–10.2)
CHLORIDE SERPL-SCNC: 104 MMOL/L (ref 98–107)
CREAT SERPL-MCNC: 1.02 MG/DL (ref 0.51–0.95)
CREAT UR-MCNC: 71.1 MG/DL
DEPRECATED HCO3 PLAS-SCNC: 25 MMOL/L (ref 22–29)
EGFRCR SERPLBLD CKD-EPI 2021: 52 ML/MIN/1.73M2
GLUCOSE SERPL-MCNC: 211 MG/DL (ref 70–99)
LDLC SERPL DIRECT ASSAY-MCNC: 64 MG/DL
MICROALBUMIN UR-MCNC: 25.4 MG/L
MICROALBUMIN/CREAT UR: 35.72 MG/G CR (ref 0–25)
POTASSIUM SERPL-SCNC: 4.4 MMOL/L (ref 3.4–5.3)
PROT SERPL-MCNC: 7 G/DL (ref 6.4–8.3)
SODIUM SERPL-SCNC: 140 MMOL/L (ref 135–145)
VIT D+METAB SERPL-MCNC: 56 NG/ML (ref 20–50)

## 2024-03-07 ENCOUNTER — TELEPHONE (OUTPATIENT)
Dept: INTERNAL MEDICINE | Facility: CLINIC | Age: 89
End: 2024-03-07
Payer: COMMERCIAL

## 2024-03-07 NOTE — TELEPHONE ENCOUNTER
Outgoing call to patient. Relayed PCP's detailed message. No further questions at this time.    Antony Elias RN

## 2024-03-07 NOTE — TELEPHONE ENCOUNTER
----- Message from Kashif Darden MD sent at 3/6/2024  9:05 PM CST -----  Please call the pt. All lab results are stable and she can continue same medications.

## 2024-03-19 DIAGNOSIS — I10 ESSENTIAL HYPERTENSION: ICD-10-CM

## 2024-03-19 RX ORDER — LISINOPRIL AND HYDROCHLOROTHIAZIDE 12.5; 2 MG/1; MG/1
1 TABLET ORAL DAILY
Qty: 90 TABLET | Refills: 3 | Status: SHIPPED | OUTPATIENT
Start: 2024-03-19

## 2024-04-01 DIAGNOSIS — I10 ESSENTIAL HYPERTENSION: ICD-10-CM

## 2024-04-01 DIAGNOSIS — E11.9 TYPE 2 DIABETES MELLITUS WITHOUT COMPLICATION, WITH LONG-TERM CURRENT USE OF INSULIN (H): ICD-10-CM

## 2024-04-01 DIAGNOSIS — Z79.4 TYPE 2 DIABETES MELLITUS WITHOUT COMPLICATION, WITH LONG-TERM CURRENT USE OF INSULIN (H): ICD-10-CM

## 2024-04-01 DIAGNOSIS — E78.2 MIXED HYPERLIPIDEMIA: ICD-10-CM

## 2024-04-01 RX ORDER — LISINOPRIL AND HYDROCHLOROTHIAZIDE 12.5; 2 MG/1; MG/1
1 TABLET ORAL DAILY
Qty: 90 TABLET | Refills: 3 | OUTPATIENT
Start: 2024-04-01

## 2024-04-01 RX ORDER — LANCETS
EACH MISCELLANEOUS
Qty: 102 EACH | Refills: 3 | Status: SHIPPED | OUTPATIENT
Start: 2024-04-01

## 2024-04-01 RX ORDER — ATORVASTATIN CALCIUM 40 MG/1
TABLET, FILM COATED ORAL
Qty: 90 TABLET | Refills: 3 | Status: SHIPPED | OUTPATIENT
Start: 2024-04-01

## 2024-04-01 RX ORDER — BLOOD SUGAR DIAGNOSTIC
STRIP MISCELLANEOUS
Qty: 400 STRIP | Refills: 0 | Status: SHIPPED | OUTPATIENT
Start: 2024-04-01

## 2024-04-01 NOTE — TELEPHONE ENCOUNTER
Medication Question or Refill    Contacts         Type Contact Phone/Fax    04/01/2024 10:51 AM CDT Phone (Incoming) Kenyatta Og (Self) [phone] (M)            What medication are you calling about (include dose and sig)?: See orders below + PATIENT ALSO NEEDS A Rx OF AMOXICILLIN FOR AN UPCOMING DENTAL APPOINTMENT    Preferred Pharmacy:   [id number] [place] [address] [address]  AT [place] [address] [address]  [facility] [address]  Phone: [phone] Fax: [phone]      Controlled Substance Agreement on file:   CSA -- Patient Level:    CSA: None found at the patient level.       Who prescribed the medication?: Old PCP    Do you need a refill? Yes    When did you use the medication last? 4.1.24    Patient offered an appointment? No    Do you have any questions or concerns?  No      Could we send this information to you in [facility] or would you prefer to receive a phone call?:   Patient would prefer a phone call   Okay to leave a detailed message?: Yes at Cell number on file:    Telephone Information:   Mobile [phone]

## 2024-04-03 ENCOUNTER — TRANSFERRED RECORDS (OUTPATIENT)
Dept: HEALTH INFORMATION MANAGEMENT | Facility: CLINIC | Age: 89
End: 2024-04-03
Payer: COMMERCIAL

## 2024-04-03 LAB — RETINOPATHY: NEGATIVE

## 2024-04-04 DIAGNOSIS — Z79.4 TYPE 2 DIABETES MELLITUS WITHOUT COMPLICATION, WITH LONG-TERM CURRENT USE OF INSULIN (H): ICD-10-CM

## 2024-04-04 DIAGNOSIS — E11.9 TYPE 2 DIABETES MELLITUS WITHOUT COMPLICATION, WITH LONG-TERM CURRENT USE OF INSULIN (H): ICD-10-CM

## 2024-04-04 RX ORDER — BLOOD SUGAR DIAGNOSTIC
STRIP MISCELLANEOUS
Qty: 400 STRIP | Refills: 0 | OUTPATIENT
Start: 2024-04-04

## 2024-07-02 DIAGNOSIS — E11.9 TYPE 2 DIABETES MELLITUS WITHOUT COMPLICATION, WITH LONG-TERM CURRENT USE OF INSULIN (H): ICD-10-CM

## 2024-07-02 DIAGNOSIS — Z79.4 TYPE 2 DIABETES MELLITUS WITHOUT COMPLICATION, WITH LONG-TERM CURRENT USE OF INSULIN (H): ICD-10-CM

## 2024-07-02 RX ORDER — INSULIN LISPRO 200 [IU]/ML
INJECTION, SOLUTION SUBCUTANEOUS
Qty: 45 ML | Refills: 3 | Status: SHIPPED | OUTPATIENT
Start: 2024-07-02

## 2024-08-29 DIAGNOSIS — Z92.29 PERSONAL HISTORY OF OTHER DRUG THERAPY: ICD-10-CM

## 2024-08-29 DIAGNOSIS — M81.0 SENILE OSTEOPOROSIS: Primary | ICD-10-CM

## 2024-09-19 ENCOUNTER — OFFICE VISIT (OUTPATIENT)
Dept: INTERNAL MEDICINE | Facility: CLINIC | Age: 89
End: 2024-09-19
Payer: COMMERCIAL

## 2024-09-19 VITALS
SYSTOLIC BLOOD PRESSURE: 134 MMHG | WEIGHT: 124.9 LBS | RESPIRATION RATE: 12 BRPM | HEART RATE: 85 BPM | HEIGHT: 66 IN | BODY MASS INDEX: 20.07 KG/M2 | DIASTOLIC BLOOD PRESSURE: 80 MMHG | OXYGEN SATURATION: 100 % | TEMPERATURE: 97.9 F

## 2024-09-19 DIAGNOSIS — I10 ESSENTIAL HYPERTENSION: ICD-10-CM

## 2024-09-19 DIAGNOSIS — M81.0 SENILE OSTEOPOROSIS: Primary | ICD-10-CM

## 2024-09-19 DIAGNOSIS — E11.9 TYPE 2 DIABETES MELLITUS TREATED WITH INSULIN (H): ICD-10-CM

## 2024-09-19 DIAGNOSIS — Z79.4 TYPE 2 DIABETES MELLITUS TREATED WITH INSULIN (H): ICD-10-CM

## 2024-09-19 DIAGNOSIS — N18.31 STAGE 3A CHRONIC KIDNEY DISEASE (H): ICD-10-CM

## 2024-09-19 PROCEDURE — 99214 OFFICE O/P EST MOD 30 MIN: CPT | Mod: 25 | Performed by: INTERNAL MEDICINE

## 2024-09-19 PROCEDURE — 96372 THER/PROPH/DIAG INJ SC/IM: CPT | Performed by: INTERNAL MEDICINE

## 2024-09-19 ASSESSMENT — PATIENT HEALTH QUESTIONNAIRE - PHQ9
10. IF YOU CHECKED OFF ANY PROBLEMS, HOW DIFFICULT HAVE THESE PROBLEMS MADE IT FOR YOU TO DO YOUR WORK, TAKE CARE OF THINGS AT HOME, OR GET ALONG WITH OTHER PEOPLE: NOT DIFFICULT AT ALL
SUM OF ALL RESPONSES TO PHQ QUESTIONS 1-9: 3
SUM OF ALL RESPONSES TO PHQ QUESTIONS 1-9: 3

## 2024-09-19 NOTE — PATIENT INSTRUCTIONS
Prolia 22nd today.  Prolia 23rd in 6 months with AWV.    DXA in 3/2025 .   Phone number to schedule [phone].    Daily calcium need is 6294-0902 mg a day from the diet and supplements.  Calcium citrate is easier to digest.  Vitamin D 2000 IU daily recommended.    Risk of rebound vertebral fractures is higher when Prolia suddenly stopped or dose was missed.      Prolia and Covid vaccine should be  for at least a week.    Patient education:  Patients advised to maintain good oral hygiene during treatment, because of the risk for osteonecrosis of the jaw.   The risk of osteonecrosis of the jaw with Prolia therapy is extremely low.   If a patient is late for Prolia therapy (>3 wks) a rapid rebound of bone loss can occur which is highly concerning and important to try to prevent to optimize bone health.   Therefore if an invasive dental procedure is required, primarily extraction or implant, while on Prolia therapy, the recommendation is to time the dental procedure optimally 4 months after the most recent dose of Prolia allowing 6-8 weeks for healing prior to next dose of Prolia.   This is based on the updated 2022 Recommendations from the American Association of Oral and Maxillofacial Surgeons.   We also recommend discussing with dentist or oral surgeon if pretreatment prophylactic oral rinses and antibiotics would be beneficial.   Optimizing oral hygiene before any invasive dental procedure significantly lowers ONJ risk.     There is a greater risk of severe hypocalcemia following denosumab administration and patient is advised that we will have to monitor calcium, phosphorous, and magnesium levels. Risk of infection is increased with denosumab use, so patient will inform me if has more frequent infections.     Atypical femur fracture  has been reported in patients receiving denosumab. The fractures may occur anywhere along the femoral shaft (may be bilateral) and commonly occur with minimal to no trauma  to the area. Some patients experience prodromal thigh or groin pain weeks or months before the fracture occurs. Contralateral limb should be assessed if AFF occurs.     Multiple vertebral column fracture has been reported following discontinuation of therapy. Hypertension and increased cholesterol were reported with denosumab use.      Discussed 10-year data of Prolia. Discussed the importance of being on time and consistent with Prolia use to prevent rapid bone of osteoclastic activity.  Discussed that if Prolia is discontinued we will likely need to utilize an antiresorptive, such as Reclast, to help prevent rapid rebound of osteoclast activity. Often more than 1 dose is required.  Labs yearly to ensure calcium and vitamin D optimized while patient on Prolia.

## 2024-09-19 NOTE — PROGRESS NOTES
(M81.0) Osteoporosis Senile  (primary encounter diagnosis)  Comment: Osteoporosis treatment past - Yes; Bisphosphonates. Osteoporosis treatment current - Yes; Prolia.   Due for Prolia # 22 today.  DXA 3/2023 reviewed and discussed today.   Considering reported general safety on long treatment with Prolia, more than 10 years, I recommended continuing Prolia treatment. Risk of rebound fractures and decline in bone density is reported with Prolia discontinuation and even with switching to bisphosphonate.        Component      Latest Ref Rng 3/5/2024  1:51 PM   Sodium      135 - 145 mmol/L 140    Potassium      3.4 - 5.3 mmol/L 4.4    Carbon Dioxide (CO2)      22 - 29 mmol/L 25    Anion Gap      7 - 15 mmol/L 11    Urea Nitrogen      8.0 - 23.0 mg/dL 26.3 (H)    Creatinine      0.51 - 0.95 mg/dL 1.02 (H)    GFR Estimate      >60 mL/min/1.73m2 52 (L)    Calcium      8.8 - 10.2 mg/dL 10.3 (H)    Chloride      98 - 107 mmol/L 104    Glucose      70 - 99 mg/dL 211 (H)    Alkaline Phosphatase      40 - 150 U/L 74    AST      0 - 45 U/L 27    ALT      0 - 50 U/L 20    Protein Total      6.4 - 8.3 g/dL 7.0    Albumin      3.5 - 5.2 g/dL 4.4    Bilirubin Total      <=1.2 mg/dL 0.5    Vitamin D, Total (25-Hydroxy)      20 - 50 ng/mL 56 (H)         Plan: DX Bone Density, Comprehensive metabolic panel            (N18.31) Stage 3a chronic kidney disease (H)  Comment: stable, Cr around 1.      (E11.9,  Z79.4) Type 2 diabetes mellitus treated with insulin (H)  Comment: Continues on Lantus sliding scale at bedtime.  Typically does not take more than 12 to 15 units of Lantus per day.  Uses generic Humalog sliding scale with meals.  Counts her carbs and adjust her insulin based on glucose reading and carb counts.  Continues to feel very comfortable managing her diabetes.   Plan: Hemoglobin A1c, Comprehensive metabolic panel            (I10) Hypertension  Comment: Blood pressure is well controlled with lisinopril-hydrochlorothiazide.       The longitudinal plan of care for the diagnosis(es)/condition(s) as documented were addressed during this visit. Due to the added complexity in care, I will continue to support Kenyatta in the subsequent management and with ongoing continuity of care.    Patient was educated on safety of Prolia utilizing Patient Counseling Chart for Healthcare Providers, as outlined by the Prolia REMS progam.     Return in about 6 months (around 3/19/2025) for AWV, Prolia.    Patient Instructions   Prolia 22nd today.  Prolia 23rd in 6 months with AWV.    DXA in 3/2025 .   Phone number to schedule [phone].    Daily calcium need is 0174-1443 mg a day from the diet and supplements.  Calcium citrate is easier to digest.  Vitamin D 2000 IU daily recommended.    Risk of rebound vertebral fractures is higher when Prolia suddenly stopped or dose was missed.      Prolia and Covid vaccine should be  for at least a week.    Patient education:  Patients advised to maintain good oral hygiene during treatment, because of the risk for osteonecrosis of the jaw.   The risk of osteonecrosis of the jaw with Prolia therapy is extremely low.   If a patient is late for Prolia therapy (>3 wks) a rapid rebound of bone loss can occur which is highly concerning and important to try to prevent to optimize bone health.   Therefore if an invasive dental procedure is required, primarily extraction or implant, while on Prolia therapy, the recommendation is to time the dental procedure optimally 4 months after the most recent dose of Prolia allowing 6-8 weeks for healing prior to next dose of Prolia.   This is based on the updated 2022 Recommendations from the American Association of Oral and Maxillofacial Surgeons.   We also recommend discussing with dentist or oral surgeon if pretreatment prophylactic oral rinses and antibiotics would be beneficial.   Optimizing oral hygiene before any invasive dental procedure significantly lowers ONJ risk.  "    There is a greater risk of severe hypocalcemia following denosumab administration and patient is advised that we will have to monitor calcium, phosphorous, and magnesium levels. Risk of infection is increased with denosumab use, so patient will inform me if has more frequent infections.     Atypical femur fracture  has been reported in patients receiving denosumab. The fractures may occur anywhere along the femoral shaft (may be bilateral) and commonly occur with minimal to no trauma to the area. Some patients experience prodromal thigh or groin pain weeks or months before the fracture occurs. Contralateral limb should be assessed if AFF occurs.     Multiple vertebral column fracture has been reported following discontinuation of therapy. Hypertension and increased cholesterol were reported with denosumab use.      Discussed 10-year data of Prolia. Discussed the importance of being on time and consistent with Prolia use to prevent rapid bone of osteoclastic activity.  Discussed that if Prolia is discontinued we will likely need to utilize an antiresorptive, such as Reclast, to help prevent rapid rebound of osteoclast activity. Often more than 1 dose is required.  Labs yearly to ensure calcium and vitamin D optimized while patient on Prolia.            /80 (BP Location: Right arm, Patient Position: Sitting, Cuff Size: Adult Regular)   Pulse 85   Temp 97.9  F (36.6  C) (Oral)   Resp 12   Ht 1.664 m (5' 5.5\")   Wt 56.7 kg (124 lb 14.4 oz)   LMP  (LMP Unknown)   SpO2 100%   BMI 20.47 kg/m        Did you experience any problems with previous Prolia injection? no  Any medication change in the last 6 months? no  Did you take prednisone or other immunosupressant drugs in the last 6 months   (chemo, transplant, rheum, dermatology conditions)? no  Did you have any serious infection in the last 6 months?no  Any recent hospitalizations?no  Do you plan any dental work in the next 2-3 months?no  How much calcium " do you take daily from the diet and supplements?1200 mg  How much vit D do you take daily? 2000 IU  Last DXA?  2023,Reviewed and discussed      Patient is here today for the Prolia injection. Patient tolerated previous injections well.   We discussed calcium and vit D daily needs today.       We discussed high risk of rebound vertebral fractures when Prolia suddenly stopped.    Next Prolia injection will be in 6 months.           This note has been dictated using voice recognition software. Any grammatical or context distortions are unintentional and inherent to the software      Patient Active Problem List   Diagnosis    Hyperlipidemia    Hypertension    Contact Dermatitis    Type 2 diabetes mellitus treated with insulin (H)    Osteoporosis Senile    Chronic kidney disease, stage 3 (H)    Moderate aortic stenosis       Current Outpatient Medications   Medication Sig Dispense Refill    amoxicillin (AMOXIL) 500 MG capsule [AMOXICILLIN (AMOXIL) 500 MG CAPSULE] TAKE 4 CAPSULES BY MOUTH 1 HOUR PRIOR TO DENTAL PROCEDURE. 4 capsule 3    aspirin 81 MG EC tablet [ASPIRIN 81 MG EC TABLET] Take 81 mg by mouth daily.      atorvastatin (LIPITOR) 40 MG tablet [ATORVASTATIN (LIPITOR) 40 MG TABLET] TAKE 1 TABLET BY MOUTH EVERY DAY 90 tablet 3    blood glucose (ACCU-CHEK GUIDE) test strip USE TO TEST BLOOD SUGAR FOUR TIMES DAILY 400 strip 0    blood glucose (NO BRAND SPECIFIED) lancets standard LANCETS (ACCU-CHEK SOFTCLIX LANCETS)] Dispense brand per patient's insurance at pharmacy discretion. Test blood sugars 4 times daily 400 each 3    blood glucose meter (GLUCOMETER) [BLOOD GLUCOSE METER (GLUCOMETER)] Use 1 each As Directed as needed. Dispense glucometer brand per patient's insurance at pharmacy discretion. test blood sugars 4 times daily 1 each 1    blood glucose monitoring (ACCU-CHEK FASTCLIX) lancets TEST FOUR TIMES DAILY 102 each 3    calcium carbonate 1250 MG capsule [CALCIUM CARBONATE 1250 MG CAPSULE] Take 1,250 mg by  mouth 2 (two) times a day with meals.      cholecalciferol, vitamin D3, 1,000 unit tablet [CHOLECALCIFEROL, VITAMIN D3, 1,000 UNIT TABLET] Take 1,000 Units by mouth daily.      denosumab (PROLIA) 60 MG/ML SOSY injection Inject 1 Dose Subcutaneous every 6 months      HUMALOG KWIKPEN 200 UNIT/ML soln INJECT UP TO 50 UNITS UNDER THE SKIN DAILY 45 mL 3    insulin glargine (LANTUS SOLOSTAR) 100 UNIT/ML pen INJECT 20 UNITS UNDER THE SKIN AT BEDTIME 30 mL 3    insulin pen needle (B-D U/F) 31G X 8 MM miscellaneous USE WITH INSULIN PENS 400 each 1    lisinopril-hydrochlorothiazide (ZESTORETIC) 20-12.5 MG tablet TAKE 1 TABLET BY MOUTH DAILY 90 tablet 3     Current Facility-Administered Medications   Medication Dose Route Frequency Provider Last Rate Last Admin    denosumab (PROLIA) injection 60 mg  60 mg Subcutaneous Q6 Months    60 mg at 09/19/24 [id number]

## 2024-09-28 ENCOUNTER — HEALTH MAINTENANCE LETTER (OUTPATIENT)
Age: 89
End: 2024-09-28

## 2024-10-26 ENCOUNTER — IMMUNIZATION (OUTPATIENT)
Dept: FAMILY MEDICINE | Facility: CLINIC | Age: 89
End: 2024-10-26
Payer: COMMERCIAL

## 2024-10-26 PROCEDURE — 90662 IIV NO PRSV INCREASED AG IM: CPT

## 2024-10-26 PROCEDURE — G0008 ADMIN INFLUENZA VIRUS VAC: HCPCS

## 2024-10-26 PROCEDURE — 91320 SARSCV2 VAC 30MCG TRS-SUC IM: CPT

## 2024-10-26 PROCEDURE — 90480 ADMN SARSCOV2 VAC 1/ONLY CMP: CPT

## 2025-01-11 DIAGNOSIS — E11.9 TYPE 2 DIABETES MELLITUS WITHOUT COMPLICATION, WITH LONG-TERM CURRENT USE OF INSULIN (H): ICD-10-CM

## 2025-01-11 DIAGNOSIS — Z79.4 TYPE 2 DIABETES MELLITUS WITHOUT COMPLICATION, WITH LONG-TERM CURRENT USE OF INSULIN (H): ICD-10-CM

## 2025-01-13 RX ORDER — INSULIN GLARGINE 100 [IU]/ML
INJECTION, SOLUTION SUBCUTANEOUS
Qty: 30 ML | Refills: 3 | Status: SHIPPED | OUTPATIENT
Start: 2025-01-13

## 2025-03-19 ENCOUNTER — OFFICE VISIT (OUTPATIENT)
Dept: INTERNAL MEDICINE | Facility: CLINIC | Age: OVER 89
End: 2025-03-19
Payer: COMMERCIAL

## 2025-03-19 VITALS
HEART RATE: 85 BPM | RESPIRATION RATE: 16 BRPM | BODY MASS INDEX: 19.77 KG/M2 | TEMPERATURE: 97.9 F | HEIGHT: 66 IN | SYSTOLIC BLOOD PRESSURE: 126 MMHG | OXYGEN SATURATION: 99 % | WEIGHT: 123 LBS | DIASTOLIC BLOOD PRESSURE: 82 MMHG

## 2025-03-19 DIAGNOSIS — E11.9 TYPE 2 DIABETES MELLITUS TREATED WITH INSULIN (H): ICD-10-CM

## 2025-03-19 DIAGNOSIS — I10 ESSENTIAL HYPERTENSION: ICD-10-CM

## 2025-03-19 DIAGNOSIS — I35.0 MODERATE AORTIC STENOSIS: ICD-10-CM

## 2025-03-19 DIAGNOSIS — E78.2 MIXED HYPERLIPIDEMIA: ICD-10-CM

## 2025-03-19 DIAGNOSIS — Z79.4 TYPE 2 DIABETES MELLITUS TREATED WITH INSULIN (H): ICD-10-CM

## 2025-03-19 DIAGNOSIS — Z00.00 ENCOUNTER FOR ANNUAL WELLNESS VISIT (AWV) IN MEDICARE PATIENT: Primary | ICD-10-CM

## 2025-03-19 DIAGNOSIS — N18.31 STAGE 3A CHRONIC KIDNEY DISEASE (H): ICD-10-CM

## 2025-03-19 DIAGNOSIS — M81.0 SENILE OSTEOPOROSIS: ICD-10-CM

## 2025-03-19 LAB
ALBUMIN UR-MCNC: NEGATIVE MG/DL
APPEARANCE UR: CLEAR
BACTERIA #/AREA URNS HPF: ABNORMAL /HPF
BILIRUB UR QL STRIP: NEGATIVE
COLOR UR AUTO: YELLOW
CREAT UR-MCNC: 56.7 MG/DL
ERYTHROCYTE [DISTWIDTH] IN BLOOD BY AUTOMATED COUNT: 15.4 % (ref 10–15)
EST. AVERAGE GLUCOSE BLD GHB EST-MCNC: 137 MG/DL
GLUCOSE UR STRIP-MCNC: NEGATIVE MG/DL
HBA1C MFR BLD: 6.4 % (ref 0–5.6)
HCT VFR BLD AUTO: 42.2 % (ref 35–47)
HGB BLD-MCNC: 13.4 G/DL (ref 11.7–15.7)
HGB UR QL STRIP: ABNORMAL
KETONES UR STRIP-MCNC: NEGATIVE MG/DL
LEUKOCYTE ESTERASE UR QL STRIP: ABNORMAL
MCH RBC QN AUTO: 31.1 PG (ref 26.5–33)
MCHC RBC AUTO-ENTMCNC: 31.8 G/DL (ref 31.5–36.5)
MCV RBC AUTO: 98 FL (ref 78–100)
MICROALBUMIN UR-MCNC: 12.2 MG/L
MICROALBUMIN/CREAT UR: 21.52 MG/G CR (ref 0–25)
NITRATE UR QL: POSITIVE
PH UR STRIP: 6 [PH] (ref 5–8)
PLATELET # BLD AUTO: 255 10E3/UL (ref 150–450)
RBC # BLD AUTO: 4.31 10E6/UL (ref 3.8–5.2)
RBC #/AREA URNS AUTO: ABNORMAL /HPF
SP GR UR STRIP: 1.01 (ref 1–1.03)
SQUAMOUS #/AREA URNS AUTO: ABNORMAL /LPF
UROBILINOGEN UR STRIP-ACNC: 0.2 E.U./DL
WBC # BLD AUTO: 5.8 10E3/UL (ref 4–11)
WBC #/AREA URNS AUTO: ABNORMAL /HPF
WBC CLUMPS #/AREA URNS HPF: PRESENT /HPF

## 2025-03-19 PROCEDURE — 3074F SYST BP LT 130 MM HG: CPT | Performed by: INTERNAL MEDICINE

## 2025-03-19 PROCEDURE — 3079F DIAST BP 80-89 MM HG: CPT | Performed by: INTERNAL MEDICINE

## 2025-03-19 PROCEDURE — 99214 OFFICE O/P EST MOD 30 MIN: CPT | Mod: 25 | Performed by: INTERNAL MEDICINE

## 2025-03-19 PROCEDURE — 96372 THER/PROPH/DIAG INJ SC/IM: CPT | Performed by: INTERNAL MEDICINE

## 2025-03-19 PROCEDURE — 85027 COMPLETE CBC AUTOMATED: CPT | Performed by: INTERNAL MEDICINE

## 2025-03-19 PROCEDURE — 36415 COLL VENOUS BLD VENIPUNCTURE: CPT | Performed by: INTERNAL MEDICINE

## 2025-03-19 PROCEDURE — 81001 URINALYSIS AUTO W/SCOPE: CPT | Performed by: INTERNAL MEDICINE

## 2025-03-19 PROCEDURE — G0439 PPPS, SUBSEQ VISIT: HCPCS | Performed by: INTERNAL MEDICINE

## 2025-03-19 PROCEDURE — 83036 HEMOGLOBIN GLYCOSYLATED A1C: CPT | Performed by: INTERNAL MEDICINE

## 2025-03-19 SDOH — HEALTH STABILITY: PHYSICAL HEALTH: ON AVERAGE, HOW MANY DAYS PER WEEK DO YOU ENGAGE IN MODERATE TO STRENUOUS EXERCISE (LIKE A BRISK WALK)?: 2 DAYS

## 2025-03-19 ASSESSMENT — SOCIAL DETERMINANTS OF HEALTH (SDOH): HOW OFTEN DO YOU GET TOGETHER WITH FRIENDS OR RELATIVES?: THREE TIMES A WEEK

## 2025-03-19 NOTE — PATIENT INSTRUCTIONS
Labs today.    Prolia # 23. Next dose with me in 6 months.    To schedule DXA scan [phone].    To schedule ECHO to follow-up aortic stenosis -

## 2025-03-19 NOTE — PROGRESS NOTES
Preventive Care Visit  [facility]  Kashif Darden MD, Internal Medicine  Mar 19, 2025      Assessment & Plan     Encounter for annual wellness visit (AWV) in Medicare patient  We discussed recommendation for Covid vaccine every 6 months.  She declined mammogram.    Type 2 diabetes mellitus treated with insulin (H)  Continues on Lantus sliding scale at bedtime.  Typically does not take more than 12 to 15 units of Lantus per day.  Uses generic Humalog sliding scale with meals.  Counts her carbs and adjust her insulin based on glucose reading and carb counts.  Continues to feel very comfortable managing her diabetes   - CBC with platelets; Future  - Comprehensive metabolic panel; Future  - Hemoglobin A1c; Future  - Hemoglobin A1c  - Comprehensive metabolic panel  - CBC with platelets    Stage 3a chronic kidney disease (H)  Stable. Cr around 1.   - Albumin Random Urine Quantitative with Creat Ratio; Future  - Albumin Random Urine Quantitative with Creat Ratio    Mixed hyperlipidemia  On statin  - LDL cholesterol direct; Future  - LDL cholesterol direct    Hypertension  Blood pressure is well controlled with lisinopril-hydrochlorothiazide   - TSH with free T4 reflex; Future  - UA Macroscopic with reflex to Microscopic and Culture; Future  - TSH with free T4 reflex  - UA Macroscopic with reflex to Microscopic and Culture    Osteoporosis Senile  Osteoporosis treatment past - Yes; Bisphosphonates. Osteoporosis treatment current - Yes; Prolia.   Due for Prolia # 23 today. Due for DXA scan.  Considering reported general safety on long treatment with Prolia, more than 10 years, I recommended continuing Prolia treatment. Risk of rebound fractures and decline in bone density is reported with Prolia discontinuation and even with switching to bisphosphonate.    The following steps were completed to comply with the REMS program for Prolia:    Reviewed information in the Medication Guide and Patient  Counseling Chart, including the serious risks of Prolia  and the symptoms of each risk.    Advised patient to seek prompt medical attention if they have signs or symptoms of any of the serious risks.    Provided each patient a copy of the Medication Guide and Patient Brochure   - TSH with free T4 reflex; Future  - Vitamin D Deficiency; Future  - DX Bone Density; Future  - Comprehensive metabolic panel  - TSH with free T4 reflex  - Vitamin D Deficiency    Moderate aortic stenosis  ECHO 3/10/23:  Left ventricular size, wall motion and function are normal. The ejection  fraction is 60-65%.  Normal right ventricle size and systolic function.  Moderate valvular aortic stenosis.  IVC diameter <2.1 cm collapsing >50% with sniff suggests a normal RA pressure  of 3 mmHg.    She will schedule follow-up ECHO. She denies presyncope or syncope, dyspnea.  - Echocardiogram Complete; Future    The longitudinal plan of care for the diagnosis(es)/condition(s) as documented were addressed during this visit. Due to the added complexity in care, I will continue to support Kenyatta in the subsequent management and with ongoing continuity of care.        Counseling  Appropriate preventive services were addressed with this patient via screening, questionnaire, or discussion as appropriate for fall prevention, nutrition, physical activity, Tobacco-use cessation, social engagement, weight loss and cognition.  Checklist reviewing preventive services available has been given to the patient.  Reviewed patient's diet, addressing concerns and/or questions.   She is at risk for lack of exercise and has been provided with information to increase physical activity for the benefit of her well-being.           Marty You is a 89 year old, presenting for the following:  Wellness Visit (Mammo 7/13/21 DXA 3/06/23 )        3/19/2025    10:48 AM   Additional Questions   Roomed by Shannon JOSEPH           Advance Care Planning  Patient does not  have a Health Care Directive: Discussed advance care planning with patient; however, patient declined at this time.      3/19/2025   General Health   How would you rate your overall physical health? Good   Feel stress (tense, anxious, or unable to sleep) Not at all         3/19/2025   Nutrition   Diet: Regular (no restrictions)         3/19/2025   Exercise   Days per week of moderate/strenous exercise 2 days   (!) EXERCISE CONCERN      3/19/2025   Social Factors   Frequency of gathering with friends or relatives Three times a week   Worry food won't last until get money to buy more No   Food not last or not have enough money for food? No   Do you have housing? (Housing is defined as stable permanent housing and does not include staying ouside in a car, in a tent, in an abandoned building, in an overnight shelter, or couch-surfing.) Yes   Are you worried about losing your housing? No   Lack of transportation? No   Unable to get utilities (heat,electricity)? No         3/19/2025   Fall Risk   Fallen 2 or more times in the past year? No    No   Trouble with walking or balance? No    No       Multiple values from one day are sorted in reverse-chronological order          3/19/2025   Activities of Daily Living- Home Safety   Needs help with the following daily activites None of the above   Safety concerns in the home None of the above         3/19/2025   Dental   Dentist two times every year? Yes         3/19/2025   Hearing Screening   Hearing concerns? None of the above         3/19/2025   Driving Risk Screening   Patient/family members have concerns about driving No         3/19/2025   General Alertness/Fatigue Screening   Have you been more tired than usual lately? No         3/19/2025   Urinary Incontinence Screening   Bothered by leaking urine in past 6 months No           Today's PHQ-2 Score:       3/19/2025    10:41 AM   PHQ-2 ( 1999 [employer])   Q1: Little interest or pleasure in doing things 0   Q2: Feeling down,  depressed or hopeless 0   PHQ-2 Score 0    Q1: Little interest or pleasure in doing things Not at all   Q2: Feeling down, depressed or hopeless Not at all   PHQ-2 Score 0       Patient-reported           3/19/2025   Substance Use   Alcohol more than 3/day or more than 7/wk Not Applicable   Do you have a current opioid prescription? No   How severe/bad is pain from 1 to 10? 0/10 (No Pain)   Do you use any other substances recreationally? (!) PRESCRIPTION DRUGS     Social History     Tobacco Use    Smoking status: Never     Passive exposure: Never    Smokeless tobacco: Never   Vaping Use    Vaping status: Never Used   Substance Use Topics    Alcohol use: No    Drug use: No                    Reviewed and updated as needed this visit by Provider                      Current providers sharing in care for this patient include:  Patient Care Team:  Kashif Darden MD as PCP - General (Internal Medicine)  Kashif Darden MD as Physician (Internal Medicine)  Kashif Darden MD as Assigned PCP    The following health maintenance items are reviewed in Epic and correct as of today:  Health Maintenance   Topic Date Due    LIPID  02/20/2024    DIABETIC FOOT EXAM  02/20/2024    DTAP/TDAP/TD IMMUNIZATION (2 - Td or Tdap) 08/28/2024    A1C  09/05/2024    BMP  03/05/2025    MICROALBUMIN  03/05/2025    ANNUAL REVIEW OF HM ORDERS  03/05/2025    MEDICARE ANNUAL WELLNESS VISIT  03/05/2025    HEMOGLOBIN  03/05/2025    EYE EXAM  04/03/2025    COVID-19 Vaccine (8 - 2024-25 season) 04/26/2025    FALL RISK ASSESSMENT  03/19/2026    ADVANCE CARE PLANNING  03/05/2029    DEXA  03/30/2038    PHQ-2 (once per calendar year)  Completed    INFLUENZA VACCINE  Completed    Pneumococcal Vaccine: 50+ Years  Completed    URINALYSIS  Completed    ZOSTER IMMUNIZATION  Completed    RSV VACCINE  Completed    HPV IMMUNIZATION  Aged Out    MENINGITIS IMMUNIZATION  Aged Out            Objective    Exam  /82   Pulse 85   Temp 97.9  F (36.6  C)  "(Temporal)   Resp 16   Ht 1.664 m (5' 5.5\")   Wt 55.8 kg (123 lb)   LMP  (LMP Unknown)   SpO2 99%   BMI 20.16 kg/m     Estimated body mass index is 20.16 kg/m  as calculated from the following:    Height as of this encounter: 1.664 m (5' 5.5\").    Weight as of this encounter: 55.8 kg (123 lb).    Physical Exam  General Appearance: Alert, cooperative, no distress, appears stated age.  Head: Normocephalic, without obvious abnormality, atraumatic  Eyes: PERRL, conjunctiva/corneas clear, EOM's intact  Ears: Normal TM's and external ear canals, both ears  Nose: Nares normal, septum midline,mucosa normal, no drainage  Throat: Lips, mucosa, and tongue normal; teeth and gums normal  Neck: Supple, symmetrical, trachea midline, no adenopathy;  thyroid: not enlarged, symmetric, no tenderness/mass/nodules; no carotid bruit or JVD  Back: Symmetric, no curvature, ROM normal, no CVA tenderness.  Lungs: Clear to auscultation bilaterally, respirations unlabored.  Breasts: No breast masses, tenderness, asymmetry, or nipple discharge.  Heart: Regular rate and rhythm, S1 and S2 normal, 3/6 sys murmur, no rub, or gallop.  Abdomen: Soft, non-tender, bowel sounds active all four quadrants,  no masses, no organomegaly.  Extremities: Extremities normal, atraumatic, no cyanosis or edema.  Skin: Skin color, texture, turgor normal, no rashes or lesions.  Lymph nodes: Cervical, supraclavicular, and axillary nodes normal.  Neurologic: No focal neurological findings.          3/19/2025   Mini Cog   Clock Draw Score 2 Normal   3 Item Recall 3 objects recalled   Mini Cog Total Score 5              Signed Electronically by: Kashif Darden MD    "

## 2025-03-20 LAB
ALBUMIN SERPL BCG-MCNC: 4.3 G/DL (ref 3.5–5.2)
ALP SERPL-CCNC: 98 U/L (ref 40–150)
ALT SERPL W P-5'-P-CCNC: 31 U/L (ref 0–50)
ANION GAP SERPL CALCULATED.3IONS-SCNC: 14 MMOL/L (ref 7–15)
AST SERPL W P-5'-P-CCNC: 37 U/L (ref 0–45)
BACTERIA UR CULT: ABNORMAL
BACTERIA UR CULT: ABNORMAL
BILIRUB SERPL-MCNC: 0.5 MG/DL
BUN SERPL-MCNC: 26.4 MG/DL (ref 8–23)
CALCIUM SERPL-MCNC: ABNORMAL MG/DL
CHLORIDE SERPL-SCNC: 104 MMOL/L (ref 98–107)
CREAT SERPL-MCNC: 0.97 MG/DL (ref 0.51–0.95)
EGFRCR SERPLBLD CKD-EPI 2021: 55 ML/MIN/1.73M2
GLUCOSE SERPL-MCNC: 138 MG/DL (ref 70–99)
HCO3 SERPL-SCNC: 24 MMOL/L (ref 22–29)
LDLC SERPL DIRECT ASSAY-MCNC: 59 MG/DL
POTASSIUM SERPL-SCNC: 4 MMOL/L (ref 3.4–5.3)
PROT SERPL-MCNC: 7 G/DL (ref 6.4–8.3)
SODIUM SERPL-SCNC: 142 MMOL/L (ref 135–145)
TSH SERPL DL<=0.005 MIU/L-ACNC: 1.9 UIU/ML (ref 0.3–4.2)
VIT D+METAB SERPL-MCNC: 53 NG/ML (ref 20–50)

## 2025-03-21 ENCOUNTER — TELEPHONE (OUTPATIENT)
Dept: INTERNAL MEDICINE | Facility: CLINIC | Age: OVER 89
End: 2025-03-21
Payer: COMMERCIAL

## 2025-03-21 NOTE — TELEPHONE ENCOUNTER
Outgoing call to patient. No answer. LMTCB. Anyone can relay message below to pt when she calls back. Thank you.       ----- Message from Kashif Darden sent at 3/21/2025  8:40 AM CDT -----  Call the pt. Positive urine culture. Rx for cephalexin is sent.

## 2025-03-24 NOTE — TELEPHONE ENCOUNTER
Outreach #2. Call to patient with no answer. LMTCB. Her other contact listed does not have CTC on file.

## 2025-04-08 DIAGNOSIS — Z79.2 NEED FOR PROPHYLACTIC ANTIBIOTIC: ICD-10-CM

## 2025-04-08 RX ORDER — AMOXICILLIN 500 MG/1
CAPSULE ORAL
Qty: 4 CAPSULE | Refills: 3 | Status: SHIPPED | OUTPATIENT
Start: 2025-04-08

## 2025-04-08 NOTE — TELEPHONE ENCOUNTER
Medication Question or Refill        What medication are you calling about (include dose and sig)?: amoxicillin (AMOXIL) 500 MG capsule     Preferred Pharmacy:  [id number] [place] [address] [facility] [place] [address] [address] [address]  Phone: [phone] Fax: [phone]    Do you need a refill? Yes    Do you have any questions or concerns?  Yes: patient needing new rx for dental work tomorrow, 4/9/25

## 2025-04-14 ENCOUNTER — HOSPITAL ENCOUNTER (OUTPATIENT)
Dept: CARDIOLOGY | Facility: CLINIC | Age: OVER 89
Discharge: HOME | End: 2025-04-14
Attending: INTERNAL MEDICINE | Admitting: INTERNAL MEDICINE
Payer: COMMERCIAL

## 2025-04-14 DIAGNOSIS — I35.0 MODERATE AORTIC STENOSIS: ICD-10-CM

## 2025-04-14 DIAGNOSIS — I10 ESSENTIAL HYPERTENSION: ICD-10-CM

## 2025-04-14 LAB — LVEF ECHO: NORMAL

## 2025-04-14 PROCEDURE — 93306 TTE W/DOPPLER COMPLETE: CPT | Mod: 26 | Performed by: INTERNAL MEDICINE

## 2025-04-14 PROCEDURE — 93306 TTE W/DOPPLER COMPLETE: CPT

## 2025-04-14 RX ORDER — LISINOPRIL AND HYDROCHLOROTHIAZIDE 12.5; 2 MG/1; MG/1
1 TABLET ORAL DAILY
Qty: 90 TABLET | Refills: 3 | Status: SHIPPED | OUTPATIENT
Start: 2025-04-14

## 2025-04-14 NOTE — TELEPHONE ENCOUNTER
Refill Request  Medication name: Pending Prescriptions:                       Disp   Refills    lisinopril-hydrochlorothiazide (ZESTORETI*90 tab*3            Sig: Take 1 tablet by mouth daily.    Requested Pharmacy:  [facility] #[id number] [place] [address] AT [place] [address]

## 2025-07-14 DIAGNOSIS — E78.2 MIXED HYPERLIPIDEMIA: ICD-10-CM

## 2025-07-15 RX ORDER — ATORVASTATIN CALCIUM 40 MG/1
40 TABLET, FILM COATED ORAL
Qty: 90 TABLET | Refills: 1 | Status: SHIPPED | OUTPATIENT
Start: 2025-07-15

## 2025-08-28 DIAGNOSIS — M81.0 SENILE OSTEOPOROSIS: Primary | ICD-10-CM

## 2025-08-28 DIAGNOSIS — Z92.29 PERSONAL HISTORY OF OTHER DRUG THERAPY: ICD-10-CM
